# Patient Record
Sex: FEMALE | Race: BLACK OR AFRICAN AMERICAN | Employment: OTHER | URBAN - METROPOLITAN AREA
[De-identification: names, ages, dates, MRNs, and addresses within clinical notes are randomized per-mention and may not be internally consistent; named-entity substitution may affect disease eponyms.]

---

## 2021-02-21 LAB — HBA1C MFR BLD HPLC: 14.6 %

## 2021-03-10 PROBLEM — I50.32 DIASTOLIC CHF, CHRONIC (HCC): Status: ACTIVE | Noted: 2021-03-10

## 2021-03-10 PROBLEM — E78.5 HYPERLIPIDEMIA ASSOCIATED WITH TYPE 2 DIABETES MELLITUS (HCC): Status: ACTIVE | Noted: 2021-03-10

## 2021-03-10 PROBLEM — E11.65 TYPE 2 DIABETES MELLITUS WITH HYPERGLYCEMIA (HCC): Status: ACTIVE | Noted: 2021-03-10

## 2021-03-10 PROBLEM — E27.1 ADDISON'S DISEASE (HCC): Status: ACTIVE | Noted: 2021-03-10

## 2021-03-10 PROBLEM — E11.69 HYPERLIPIDEMIA ASSOCIATED WITH TYPE 2 DIABETES MELLITUS (HCC): Status: ACTIVE | Noted: 2021-03-10

## 2021-03-10 NOTE — PROGRESS NOTES
Transitional Care Management Progress Note    Patient: Jefferson Lyons  : 1950  PCP: Lucinda Harman NP    Date of admission: 2021  Date of discharge: 2021    Patient was contacted by Transitional Care Management services within two days after her discharge: No. This encounter and supporting documentation was reviewed if available. Medication reconciliation was performed today (3/11/2021). Assessment/Plan:     1. Type 2 diabetes mellitus with hyperglycemia, with long-term current use of insulin ([facility]  Assessment & Plan:  Patient unclear about regimen  Refer to pharmacist for better control, given information to call and set up appointment  Orders:  -     METABOLIC PANEL, COMPREHENSIVE; Future  -     HEMOGLOBIN A1C WITH EAG; Future  -     REFERRAL TO Mukesh Bell  2. Type 2 diabetes mellitus with peripheral neuropathy (HCC)  Assessment & Plan:  Stable on regimen, continue  Will need CSA signed prior to next refills  Orders:  -     REFERRAL TO PHARMACIST  3. Hyperlipidemia associated with type 2 diabetes mellitus ([facility]  Assessment & Plan:  Labs overdue, ordered  LDL goal <70, continue regimen for now  Orders:  -     LIPID PANEL; Future  -     METABOLIC PANEL, COMPREHENSIVE; Future  4. Hypertensive heart and chronic kidney disease with heart failure and stage 1 through stage 4 chronic kidney disease, or chronic kidney disease ([facility]  Assessment & Plan:  BP goal <130/80, continue regimen  Home BP monitoring recommended  Orders:  -     METABOLIC PANEL, COMPREHENSIVE; Future  -     REFERRAL TO CARDIOLOGY  5. Diastolic CHF, chronic (HCC)  Assessment & Plan:  Refer to cardiology for management  Orders:  -     METABOLIC PANEL, COMPREHENSIVE; Future  -     REFERRAL TO CARDIOLOGY  -     REFERRAL TO HOME HEALTH  6. Stage 3a chronic kidney disease  Assessment & Plan:  Labs ordered, continue ARBs for now  Orders:  -     METABOLIC PANEL, COMPREHENSIVE; Future  7.  Normocytic anemia  -     CBC WITH AUTOMATED DIFF; Future  8. Randall's disease ([facility]  -     REFERRAL TO ENDOCRINOLOGY  9. Rheumatoid arthritis, involving unspecified site, unspecified whether rheumatoid factor present ([facility]  -     [address]  10. Centrilobular emphysema ([facility]  Assessment & Plan:  Stable on regimen, continue  11. Bilateral femoral artery stenosis [facility])  Assessment & Plan:  Recheck labs  Continue statin and ASA for now  12. Need for hepatitis C screening test  -     HEPATITIS C AB; Future    Follow-up and Dispositions    · Return in about 4 weeks (around 4/8/2021) for  [address], diabetes, lab results. Subjective:   Kyle Glynn is a 79 y.o. female presenting today for follow-up after being discharged from [facility].  The discharge summary was reviewed or requested. The main problem requiring admission was hyperglycemia. Complications during admission: see dx list    HPI:    Patient presents today with her sister, Ms. Chyna Bassett. Patient is new to the area, moved from [place], [place]. Patient presented to the ED with c/o left arm pain and elevated blood sugar. She states the hospital did not change her insulin regimen. Patient states her blood sugar continues to be elevated between 200-400. Has not been able to tolerate Metformin d/t GI side effects    Blood pressures not being taken at home, but normally runs in the 130-140s when she goes to her doctor's office    Interval history/Current status: Guarded    Admitting symptoms have: improved    Medications marked \"taking\" at this time:  Home Medications    Medication Sig Start Date End Date Taking? Authorizing Provider   methotrexate (RHEUMATREX) 2.5 mg tablet Take 15 mg by mouth every Monday. 2/8/21  Yes Provider, Historical   pantoprazole (PROTONIX) 40 mg tablet Take 40 mg by mouth daily.  2/2/21  Yes Provider, Historical   hydrocortisone (CORTEF) 10 mg tablet Take 10 mg by mouth two (2) times a day. 2/2/21  Yes Provider, Historical   rosuvastatin (CRESTOR) 10 mg tablet Take 10 mg by mouth nightly. 2/2/21  Yes Provider, Historical   furosemide (LASIX) 40 mg tablet Take 40 mg by mouth daily. 2/2/21  Yes Provider, Historical   folic acid (FOLVITE) 1 mg tablet Take 1 mg by mouth daily. Yes Provider, Historical   aspirin delayed-release 81 mg tablet Take 81 mg by mouth daily. Yes Provider, Historical   glucose blood VI test strips (ASCENSIA AUTODISC VI, ONE TOUCH ULTRA TEST VI) strip Freestyle Lite Strips; ICD 10 Code: E 11.9 Type 2 Diabetes; On Insulin: Yes Z279.4 Long Term (current) Insulin Use. 2/2/21  Yes Provider, Historical   Blood-Glucose Meter misc Freestyle Freedom Lite Blood Glucose Meter; E 11.9 Type 2 Diabetes; On Insulin: Yes Z279.4 Long Term (current) Insulin Use. 2/2/21  Yes Provider, Historical   albuterol (PROVENTIL HFA, VENTOLIN HFA, PROAIR HFA) 90 mcg/actuation inhaler Take 2 Puffs by inhalation every six (6) hours as needed. 2/2/21  Yes Provider, Historical   insulin aspart U-100 (NOVOLOG) 100 unit/mL injection 10 Units by SubCUTAneous route Before breakfast, lunch, and dinner. 2/2/21  Yes Provider, Historical   insulin detemir U-100 (Levemir FlexTouch U-100 Insuln) 100 unit/mL (3 mL) inpn 12 Units by SubCUTAneous route every twelve (12) hours. 2/2/21  Yes Provider, Historical   lancets misc Trueplus Lancets; ICD 10 Code: E 11.9 Type 2 Diabetes; On Insulin: Yes Z279.4 Long Term (current) Insulin Use. 2/2/21  Yes Provider, Historical   gabapentin (NEURONTIN) 300 mg capsule Take 300 mg by mouth three (3) times daily. 2/2/21  Yes Provider, Historical   losartan (COZAAR) 25 mg tablet Take 25 mg by mouth daily. 2/2/21  Yes Provider, Historical   amLODIPine (NORVASC) 10 mg tablet Take 10 mg by mouth daily.  Indications: high blood pressure   Yes Provider, Historical          Patient Active Problem List   Diagnosis Code    Type 2 diabetes mellitus with hyperglycemia ([facility] E11.65    Darrius's disease ([facility] Y21.2    Diastolic CHF, chronic (HCC) I50.32    Hyperlipidemia associated with type 2 diabetes mellitus (HCC) E11.69, E78.5    Hypertensive heart and chronic kidney disease with heart failure and stage 1 through stage 4 chronic kidney disease, or chronic kidney disease (HCC) I13.0    Stage 3a chronic kidney disease N18.31    Normocytic anemia D64.9    Rheumatoid arthritis (HCC) M06.9    Type 2 diabetes mellitus with peripheral neuropathy (HCC) E11.42    Gastroesophageal reflux disease without esophagitis K21.9    Centrilobular emphysema (HCC) J43.2    Bilateral femoral artery stenosis (HCC) I70.203       Not on File   Past Medical History:   Diagnosis Date    Arthritis     Autoimmune disease (HCC)     RA    Congestive heart failure (HCC)     Neuropathy     Seizures (HCC)       Social History     Socioeconomic History    Marital status: SINGLE     Spouse name: Not on file    Number of children: Not on file    Years of education: Not on file    Highest education level: Not on file   Occupational History    Not on file   Social Needs    Financial resource strain: Not on file    Food insecurity     Worry: Not on file     Inability: Not on file    Transportation needs     Medical: Not on file     Non-medical: Not on file   Tobacco Use    Smoking status: Former Smoker     Types: Cigarettes    Smokeless tobacco: Never Used   Substance and Sexual Activity    Alcohol use: Not Currently    Drug use: Not Currently    Sexual activity: Not on file   Lifestyle    Physical activity     Days per week: Not on file     Minutes per session: Not on file    Stress: Not on file   Relationships    Social connections     Talks on phone: Not on file     Gets together: Not on file     Attends [employer] service: Not on file     Active member of club or organization: Not on file     Attends meetings of clubs or organizations: Not on file     Relationship status: Not on file    Intimate partner violence     Fear of current or ex partner: Not on file     Emotionally abused: Not on file     Physically abused: Not on file     Forced sexual activity: Not on file   Other Topics Concern    Not on file   Social History Narrative    Not on file      Past Surgical History:   Procedure Laterality Date    HX BREAST BIOPSY Bilateral       Family History   Problem Relation Age of Onset    Hypertension Mother     Hypertension Father         Review of Systems   Constitutional: Negative for chills, fever and malaise/fatigue. Respiratory: Negative for cough and shortness of breath. Cardiovascular: Positive for leg swelling. Negative for chest pain. Gastrointestinal: Negative for nausea and vomiting. Genitourinary: Positive for frequency. Neurological: Negative for dizziness and headaches. Objective:      Physical Exam   Constitutional: Stable-appearing, in no distress, alert and oriented  HENT:   Head: Normocephalic and atraumatic. Ears:  Hearing grossly intact. Mouth:  No visible perioral lesions, cyanosis, or lip swelling. Pulmonary/Chest: Does not appear dyspneic, no audible wheezes or nasal flaring. Musculoskeletal: Grossly normal active ROM in upper extremities. Neurological:  Intact recent memory, no facial or eyelid drooping, no speech impairment, answering questions appropriately. Psychiatric: Judgment and insight good, normal mood and affect. We discussed the expected course, resolution and complications of the diagnosis(es) in detail. Medication risks, benefits, costs, interactions, and alternatives were discussed as indicated. I advised her to contact the office if her condition worsens, changes or fails to improve as anticipated. She expressed understanding with the diagnosis(es) and plan.        Heather Almanza, who was evaluated through a synchronous (real-time) audio-video encounter and/or her healthcare decision maker, is aware that it is a billable service, with coverage as determined by her insurance carrier. She provided verbal consent to proceed: Yes, and patient identification was verified. It was conducted pursuant to the emergency declaration under the [address] [place] [address] and the [employer] Act. A caregiver was present when appropriate. Ability to conduct physical exam was limited. I was in the office. The patient was at home.       Christiano Olivarez NP

## 2021-03-11 ENCOUNTER — DOCUMENTATION ONLY (OUTPATIENT)
Dept: FAMILY MEDICINE CLINIC | Age: 71
End: 2021-03-11

## 2021-03-11 ENCOUNTER — VIRTUAL VISIT (OUTPATIENT)
Dept: FAMILY MEDICINE CLINIC | Age: 71
End: 2021-03-11
Payer: COMMERCIAL

## 2021-03-11 DIAGNOSIS — M06.9 RHEUMATOID ARTHRITIS, INVOLVING UNSPECIFIED SITE, UNSPECIFIED WHETHER RHEUMATOID FACTOR PRESENT (HCC): ICD-10-CM

## 2021-03-11 DIAGNOSIS — Z11.59 NEED FOR HEPATITIS C SCREENING TEST: ICD-10-CM

## 2021-03-11 DIAGNOSIS — E78.5 HYPERLIPIDEMIA ASSOCIATED WITH TYPE 2 DIABETES MELLITUS (HCC): ICD-10-CM

## 2021-03-11 DIAGNOSIS — E11.69 HYPERLIPIDEMIA ASSOCIATED WITH TYPE 2 DIABETES MELLITUS (HCC): ICD-10-CM

## 2021-03-11 DIAGNOSIS — E27.1 ADDISON'S DISEASE (HCC): ICD-10-CM

## 2021-03-11 DIAGNOSIS — N18.31 STAGE 3A CHRONIC KIDNEY DISEASE (HCC): ICD-10-CM

## 2021-03-11 DIAGNOSIS — E11.65 TYPE 2 DIABETES MELLITUS WITH HYPERGLYCEMIA, WITH LONG-TERM CURRENT USE OF INSULIN (HCC): Primary | ICD-10-CM

## 2021-03-11 DIAGNOSIS — J43.2 CENTRILOBULAR EMPHYSEMA (HCC): ICD-10-CM

## 2021-03-11 DIAGNOSIS — I13.0 HYPERTENSIVE HEART AND CHRONIC KIDNEY DISEASE WITH HEART FAILURE AND STAGE 1 THROUGH STAGE 4 CHRONIC KIDNEY DISEASE, OR CHRONIC KIDNEY DISEASE (HCC): ICD-10-CM

## 2021-03-11 DIAGNOSIS — I50.32 DIASTOLIC CHF, CHRONIC (HCC): ICD-10-CM

## 2021-03-11 DIAGNOSIS — Z79.4 TYPE 2 DIABETES MELLITUS WITH HYPERGLYCEMIA, WITH LONG-TERM CURRENT USE OF INSULIN (HCC): Primary | ICD-10-CM

## 2021-03-11 DIAGNOSIS — D64.9 NORMOCYTIC ANEMIA: ICD-10-CM

## 2021-03-11 DIAGNOSIS — I70.203 BILATERAL FEMORAL ARTERY STENOSIS (HCC): ICD-10-CM

## 2021-03-11 DIAGNOSIS — E11.42 TYPE 2 DIABETES MELLITUS WITH PERIPHERAL NEUROPATHY (HCC): ICD-10-CM

## 2021-03-11 PROBLEM — K21.9 GASTROESOPHAGEAL REFLUX DISEASE WITHOUT ESOPHAGITIS: Status: ACTIVE | Noted: 2021-03-11

## 2021-03-11 PROBLEM — I11.0 HYPERTENSIVE HEART DISEASE WITH HEART FAILURE (HCC): Status: ACTIVE | Noted: 2021-03-11

## 2021-03-11 PROCEDURE — 99204 OFFICE O/P NEW MOD 45 MIN: CPT | Performed by: NURSE PRACTITIONER

## 2021-03-11 RX ORDER — AMLODIPINE BESYLATE 5 MG/1
10 TABLET ORAL DAILY
COMMUNITY
Start: 2021-02-02 | End: 2021-03-11 | Stop reason: CLARIF

## 2021-03-11 RX ORDER — ASPIRIN 81 MG/1
81 TABLET ORAL DAILY
COMMUNITY

## 2021-03-11 RX ORDER — ALBUTEROL SULFATE 90 UG/1
2 AEROSOL, METERED RESPIRATORY (INHALATION)
COMMUNITY
Start: 2021-02-02 | End: 2022-01-20 | Stop reason: SDUPTHER

## 2021-03-11 RX ORDER — HYDROCORTISONE 10 MG/1
10 TABLET ORAL 2 TIMES DAILY
COMMUNITY
Start: 2021-02-02 | End: 2021-04-09 | Stop reason: SDUPTHER

## 2021-03-11 RX ORDER — INSULIN ASPART 100 [IU]/ML
12 INJECTION, SOLUTION INTRAVENOUS; SUBCUTANEOUS
COMMUNITY
Start: 2021-02-02 | End: 2021-11-24 | Stop reason: SDUPTHER

## 2021-03-11 RX ORDER — PANTOPRAZOLE SODIUM 40 MG/1
40 TABLET, DELAYED RELEASE ORAL DAILY
COMMUNITY
Start: 2021-02-02 | End: 2021-05-06

## 2021-03-11 RX ORDER — LOSARTAN POTASSIUM 25 MG/1
25 TABLET ORAL DAILY
COMMUNITY
Start: 2021-02-02 | End: 2021-04-07 | Stop reason: SDUPTHER

## 2021-03-11 RX ORDER — METHOTREXATE 2.5 MG/1
15 TABLET ORAL
COMMUNITY
Start: 2021-02-08

## 2021-03-11 RX ORDER — BLOOD-GLUCOSE METER
EACH MISCELLANEOUS
COMMUNITY
Start: 2021-02-02

## 2021-03-11 RX ORDER — GABAPENTIN 300 MG/1
300 CAPSULE ORAL 3 TIMES DAILY
COMMUNITY
Start: 2021-02-02 | End: 2021-04-08 | Stop reason: SDUPTHER

## 2021-03-11 RX ORDER — FOLIC ACID 1 MG/1
1 TABLET ORAL DAILY
COMMUNITY

## 2021-03-11 RX ORDER — INSULIN DETEMIR 100 [IU]/ML
12 INJECTION, SOLUTION SUBCUTANEOUS EVERY 12 HOURS
COMMUNITY
Start: 2021-02-02 | End: 2021-04-08 | Stop reason: SDUPTHER

## 2021-03-11 RX ORDER — LANCETS
EACH MISCELLANEOUS
COMMUNITY
Start: 2021-02-02

## 2021-03-11 RX ORDER — FUROSEMIDE 40 MG/1
40 TABLET ORAL DAILY
COMMUNITY
Start: 2021-02-02 | End: 2021-04-07 | Stop reason: SDUPTHER

## 2021-03-11 RX ORDER — ROSUVASTATIN CALCIUM 10 MG/1
10 TABLET, COATED ORAL
COMMUNITY
Start: 2021-02-02 | End: 2021-04-07 | Stop reason: SDUPTHER

## 2021-03-11 RX ORDER — AMLODIPINE BESYLATE 10 MG/1
10 TABLET ORAL DAILY
COMMUNITY
End: 2021-04-07 | Stop reason: SDUPTHER

## 2021-03-11 NOTE — ASSESSMENT & PLAN NOTE
Patient unclear about regimen  Refer to pharmacist for better control, given information to call and set up appointment

## 2021-03-12 ENCOUNTER — TELEPHONE (OUTPATIENT)
Dept: FAMILY MEDICINE CLINIC | Age: 71
End: 2021-03-12

## 2021-03-12 NOTE — TELEPHONE ENCOUNTER
Called patient to schedule appointment for PharmD diabetes education and management per referral from PCP LEAH Field. Confirmed patient's . Scheduled patient for PharmD telephone appointment on 3/23. Instructed patient to have all medications and BG readings ready for appointment. Patient expressed understanding and had no further questions. Thank you,  Valentina Marin.  Pura Gosselin, VIDHID, BCPS    CLINICAL PHARMACY CONSULT: MED RECONCILIATION/REVIEW ADDENDUM    For Pharmacy Admin Tracking Only    PHSO: PHSO Patient?: No  Time Spent (min): 5

## 2021-03-23 ENCOUNTER — VIRTUAL VISIT (OUTPATIENT)
Dept: FAMILY MEDICINE CLINIC | Age: 71
End: 2021-03-23

## 2021-03-23 DIAGNOSIS — Z79.4 TYPE 2 DIABETES MELLITUS WITH HYPERGLYCEMIA, WITH LONG-TERM CURRENT USE OF INSULIN (HCC): Primary | ICD-10-CM

## 2021-03-23 DIAGNOSIS — E11.65 TYPE 2 DIABETES MELLITUS WITH HYPERGLYCEMIA, WITH LONG-TERM CURRENT USE OF INSULIN (HCC): Primary | ICD-10-CM

## 2021-03-23 NOTE — Clinical Note
Hello!  I spoke with Ms. Lounejoel Hilary and Ms. Cira Carmona. I did not make any adjustments to her insulin today, mainly because she isn't getting in all of her insulin injections per day and is barely checking her blood sugars. Therefore, I am not sure yet where her control is. She would be a candidate for a CGM to help with that, but Medicare requires her to provide documentation that she has been checking blood sugars 4x daily for at least 2-4 weeks. She says she will provide this for us at her follow up with you, so if she does, please let me know. I think she may also be a candidate for an Endo referral. I did not discuss this with her, but she may want to consider an insulin pump. She will need to show that she is motivated and willing to keep up with the supplies, but at least this would alleviate the injection burden I think she is experiencing. Calling her to follow up in 2 weeks before she sees you. She also states that she needs refills for all of her medications. Thanks!   Yusuf Hernandez

## 2021-04-07 ENCOUNTER — OFFICE VISIT (OUTPATIENT)
Dept: CARDIOLOGY CLINIC | Age: 71
End: 2021-04-07
Payer: MEDICARE

## 2021-04-07 VITALS
HEIGHT: 67 IN | HEART RATE: 84 BPM | DIASTOLIC BLOOD PRESSURE: 60 MMHG | OXYGEN SATURATION: 96 % | BODY MASS INDEX: 23.7 KG/M2 | SYSTOLIC BLOOD PRESSURE: 152 MMHG | WEIGHT: 151 LBS

## 2021-04-07 DIAGNOSIS — I50.32 DIASTOLIC CHF, CHRONIC (HCC): Primary | ICD-10-CM

## 2021-04-07 LAB
ABSOLUTE LYMPHOCYTE COUNT, 10803: 2.4 K/UL (ref 1–4.8)
ANION GAP SERPL CALC-SCNC: 7.5 MMOL/L (ref 3–15)
AVG GLU, 10930: 357 MG/DL (ref 91–123)
BASOPHILS # BLD: 0.1 K/UL (ref 0–0.2)
BASOPHILS NFR BLD: 1 % (ref 0–2)
BUN SERPL-MCNC: 17 MG/DL (ref 6–22)
CALCIUM SERPL-MCNC: 9.1 MG/DL (ref 8.4–10.5)
CHLORIDE SERPL-SCNC: 104 MMOL/L (ref 98–110)
CHOLEST SERPL-MCNC: 183 MG/DL (ref 110–200)
CO2 SERPL-SCNC: 28 MMOL/L (ref 20–32)
CREAT SERPL-MCNC: 1 MG/DL (ref 0.8–1.4)
EOSINOPHIL # BLD: 0.4 K/UL (ref 0–0.5)
EOSINOPHIL NFR BLD: 5 % (ref 0–6)
ERYTHROCYTE [DISTWIDTH] IN BLOOD BY AUTOMATED COUNT: 15.1 % (ref 10–15.5)
GFRAA, 66117: >60
GFRNA, 66118: 52.4
GLUCOSE SERPL-MCNC: 151 MG/DL (ref 70–99)
GRANULOCYTES,GRANS: 59 % (ref 40–75)
HBA1C MFR BLD HPLC: 14.1 % (ref 4.8–5.6)
HCT VFR BLD AUTO: 33.5 % (ref 35.1–48.3)
HCV AB SER IA-ACNC: NORMAL
HDLC SERPL-MCNC: 3.7 MG/DL (ref 0–5)
HDLC SERPL-MCNC: 49 MG/DL
HGB BLD-MCNC: 10.5 G/DL (ref 11.7–16.1)
LDL/HDL RATIO,LDHD: 2.3
LDLC SERPL CALC-MCNC: 110 MG/DL (ref 50–99)
LYMPHOCYTES, LYMLT: 26 % (ref 20–45)
MCH RBC QN AUTO: 26 PG (ref 26–34)
MCHC RBC AUTO-ENTMCNC: 31 G/DL (ref 31–36)
MCV RBC AUTO: 84 FL (ref 81–99)
MONOCYTES # BLD: 0.9 K/UL (ref 0.1–1)
MONOCYTES NFR BLD: 10 % (ref 3–12)
NEUTROPHILS # BLD AUTO: 5.3 K/UL (ref 1.8–7.7)
NON-HDL CHOLESTEROL, 011976: 134 MG/DL
PLATELET # BLD AUTO: 232 K/UL (ref 140–440)
PMV BLD AUTO: 12.4 FL (ref 9–13)
POTASSIUM SERPL-SCNC: 4.1 MMOL/L (ref 3.5–5.5)
RBC # BLD AUTO: 3.97 M/UL (ref 3.8–5.2)
SODIUM SERPL-SCNC: 139 MMOL/L (ref 133–145)
TRIGL SERPL-MCNC: 125 MG/DL (ref 40–149)
VLDLC SERPL CALC-MCNC: 25 MG/DL (ref 8–30)
WBC # BLD AUTO: 9.1 K/UL (ref 4–11)

## 2021-04-07 PROCEDURE — 99204 OFFICE O/P NEW MOD 45 MIN: CPT | Performed by: INTERNAL MEDICINE

## 2021-04-07 RX ORDER — AMLODIPINE BESYLATE 10 MG/1
10 TABLET ORAL DAILY
Qty: 30 TAB | Refills: 6 | Status: SHIPPED | OUTPATIENT
Start: 2021-04-07 | End: 2021-04-08 | Stop reason: SDUPTHER

## 2021-04-07 RX ORDER — ROSUVASTATIN CALCIUM 10 MG/1
10 TABLET, COATED ORAL
Qty: 30 TAB | Refills: 6 | Status: SHIPPED | OUTPATIENT
Start: 2021-04-07 | End: 2021-04-08 | Stop reason: DRUGHIGH

## 2021-04-07 RX ORDER — FUROSEMIDE 40 MG/1
40 TABLET ORAL DAILY
Qty: 30 TAB | Refills: 6 | Status: SHIPPED | OUTPATIENT
Start: 2021-04-07 | End: 2021-06-29 | Stop reason: SDUPTHER

## 2021-04-07 RX ORDER — LOSARTAN POTASSIUM 25 MG/1
25 TABLET ORAL DAILY
Qty: 30 TAB | Refills: 6 | Status: SHIPPED | OUTPATIENT
Start: 2021-04-07 | End: 2021-04-08 | Stop reason: DRUGHIGH

## 2021-04-07 NOTE — PROGRESS NOTES
Patrizia Morales    HFpEF    HPI    Patrizia Morales is a 79 y.o. -American female with no known coronary disease here to establish cardiovascular care for heart failure. You know patient just recently wrote relocated from [place] living here with her sister. She has diabetes heart failure with preserved ejection fraction, hypertension rheumatoid arthritis on methotrexate as well as some adrenal issues. We will have all the records but majority come from review of her nurse practitioners note as well as from her most recent ER visit where she presented with chest pain and her blood pressure was greater than 200. Does not look like a lot of things were changed then. She did have an echocardiogram and her EF was 46% with grade 2 diastolic dysfunction to moderate MR. We reviewed her blood work together and her kidney function was normal, BNP 300s. Been taking Lasix 40 daily, still feels swollen all over but says gets better later in the day. No recurrent CP. Past Medical History:   Diagnosis Date    Arthritis     Autoimmune disease ([facility]     RA    Congestive heart failure (HCC)     Neuropathy     Seizures (HCC)        Past Surgical History:   Procedure Laterality Date    HX BREAST BIOPSY Bilateral     HX CATARACT REMOVAL         Current Outpatient Medications   Medication Sig Dispense Refill    methotrexate (RHEUMATREX) 2.5 mg tablet Take 15 mg by mouth every Monday.  pantoprazole (PROTONIX) 40 mg tablet Take 40 mg by mouth daily.  hydrocortisone (CORTEF) 10 mg tablet Take 10 mg by mouth two (2) times a day.  rosuvastatin (CRESTOR) 10 mg tablet Take 10 mg by mouth nightly.  furosemide (LASIX) 40 mg tablet Take 40 mg by mouth daily.  folic acid (FOLVITE) 1 mg tablet Take 1 mg by mouth daily.  aspirin delayed-release 81 mg tablet Take 81 mg by mouth daily.       glucose blood VI test strips (ASCENSIA AUTODISC VI, ONE TOUCH ULTRA TEST VI) strip Freestyle Lite Strips; ICD 10 Code: E 11.9 Type 2 Diabetes; On Insulin: Yes Z279.4 Long Term (current) Insulin Use.  Blood-Glucose Meter misc Freestyle Freedom Lite Blood Glucose Meter; E 11.9 Type 2 Diabetes; On Insulin: Yes Z279.4 Long Term (current) Insulin Use.  albuterol (PROVENTIL HFA, VENTOLIN HFA, PROAIR HFA) 90 mcg/actuation inhaler Take 2 Puffs by inhalation every six (6) hours as needed.  insulin aspart U-100 (NOVOLOG) 100 unit/mL injection 10 Units by SubCUTAneous route Before breakfast, lunch, and dinner.  insulin detemir U-100 (Levemir FlexTouch U-100 Insuln) 100 unit/mL (3 mL) inpn 12 Units by SubCUTAneous route every twelve (12) hours.  lancets misc Trueplus Lancets; ICD 10 Code: E 11.9 Type 2 Diabetes; On Insulin: Yes Z279.4 Long Term (current) Insulin Use.  gabapentin (NEURONTIN) 300 mg capsule Take 300 mg by mouth three (3) times daily.  losartan (COZAAR) 25 mg tablet Take 25 mg by mouth daily.  amLODIPine (NORVASC) 10 mg tablet Take 10 mg by mouth daily. Indications: high blood pressure         Allergies   Allergen Reactions    Codeine Other (comments)     Patient does not like the reaction towards her body.        Social History     Socioeconomic History    Marital status: SINGLE     Spouse name: Not on file    Number of children: Not on file    Years of education: Not on file    Highest education level: Not on file   Occupational History    Not on file   Social Needs    Financial resource strain: Not on file    Food insecurity     Worry: Not on file     Inability: Not on file    Transportation needs     Medical: Not on file     Non-medical: Not on file   Tobacco Use    Smoking status: Former Smoker     Packs/day: 0.50     Years: 50.00     Pack years: 25.00     Types: Cigarettes     Quit date: 2009     Years since quittin.2    Smokeless tobacco: Never Used   Substance and Sexual Activity    Alcohol use: Never     Frequency: Never    Drug use: Never    Sexual activity: Not on file   Lifestyle    Physical activity     Days per week: Not on file     Minutes per session: Not on file    Stress: Not on file   Relationships    Social connections     Talks on phone: Not on file     Gets together: Not on file     Attends Yazidi service: Not on file     Active member of club or organization: Not on file     Attends meetings of clubs or organizations: Not on file     Relationship status: Not on file    Intimate partner violence     Fear of current or ex partner: Not on file     Emotionally abused: Not on file     Physically abused: Not on file     Forced sexual activity: Not on file   Other Topics Concern    Not on file   Social History Narrative    Not on file    [facility]    FH: neg premature ASCVD, no SCD    Review of Systems    14 pt Review of Systems is negative unless otherwise mentioned in the HPI. Wt Readings from Last 3 Encounters:   04/07/21 68.5 kg (151 lb)     Temp Readings from Last 3 Encounters:   No data found for Temp     BP Readings from Last 3 Encounters:   04/07/21 (!) 152/60     Pulse Readings from Last 3 Encounters:   04/07/21 84            Physical Exam:    Visit Vitals  BP (!) 152/60 (BP 1 Location: Left upper arm, BP Patient Position: Sitting, BP Cuff Size: Adult)   Pulse 84   Ht 5' 6.5\" (1.689 m)   Wt 68.5 kg (151 lb)   SpO2 96%   BMI 24.01 kg/m²      Physical Exam  HENT:      Head: Normocephalic and atraumatic. Eyes:      Pupils: Pupils are equal, round, and reactive to light. Cardiovascular:      Rate and Rhythm: Normal rate and regular rhythm. Heart sounds: Normal heart sounds. No murmur. No friction rub. No gallop. Pulmonary:      Effort: Pulmonary effort is normal. No respiratory distress. Breath sounds: Normal breath sounds. No wheezing or rales. Chest:      Chest wall: No tenderness. Abdominal:      General: Bowel sounds are normal.      Palpations: Abdomen is soft.    Musculoskeletal:         General: No tenderness. Right lower leg: Edema present. Left lower leg: Edema present. Skin:     General: Skin is warm and dry. Neurological:      Mental Status: She is alert and oriented to person, place, and time. EKG today shows: NSR, normal axis and intervals, no ST segment abnormalities    Impression and Plan:  Rebecca Bean is a 79 y.o. with:    1.) AECHF/ HFpEF ~45%  2.) HTN, improved  3.) h/o atypical CP  4.) Mild to moderate MR    1.) Cont Lasix 40 mg daily  2.) RTC 2 months recheck with NP  3.) Low sodium diet and daily wts  4.) RTC with me 6 months    Theres room with her meds to increase (Losartan for better BP control if needed)  Fluid status looks compensated on exam today    Thank you for allowing me to participate in the care of your patient, please do not hesitate to call with questions or concerns. Follow-up and Dispositions    · Return in about 6 months (around 10/7/2021).      [facility],    Sherri DO Ping

## 2021-04-07 NOTE — PROGRESS NOTES
Patrizia Morales is a 79 y.o. female who was seen by synchronous (real-time) audio-video technology on 4/8/2021 for Follow-up (Abnormal labs), Diabetes, Cholesterol Problem, Hypertension, and CHF    Assessment & Plan:     1. Type 2 diabetes mellitus with hyperglycemia, with long-term current use of insulin (HCC)  Assessment & Plan:  Uncontrolled, A1c goal <7  Refer to endocrine, given contact info to make appt  Discussed diet at length, emphasized medication compliance  Recheck A1c in 3 mos  Orders:  -     METABOLIC PANEL, COMPREHENSIVE; Future  -     HEMOGLOBIN A1C WITH EAG; Future  -     MICROALBUMIN, UR, RAND W/ MICROALB/CREAT RATIO; Future  -     REFERRAL TO OPHTHALMOLOGY  -     REFERRAL TO ENDOCRINOLOGY  2. Type 2 diabetes mellitus with peripheral neuropathy (HCC)  Assessment & Plan:  Stable on gabapentin, continue for now   reviewed, no issues identified  Will need to sign CSA at next appointment  Orders:  -     METABOLIC PANEL, COMPREHENSIVE; Future  -     HEMOGLOBIN A1C WITH EAG; Future  -     MICROALBUMIN, UR, RAND W/ MICROALB/CREAT RATIO; Future  -     REFERRAL TO OPHTHALMOLOGY  -     REFERRAL TO ENDOCRINOLOGY  -     gabapentin (NEURONTIN) 300 mg capsule; Take 1 Cap by mouth three (3) times daily. Max Daily Amount: 900 mg., Normal, Disp-90 Cap, R-0  3. Stage 3a chronic kidney disease ([facility]  Assessment & Plan:  Stable, continue ARBs  Repeat labs in 3 mos  Orders:  -     METABOLIC PANEL, COMPREHENSIVE; Future  4. Hyperlipidemia associated with type 2 diabetes mellitus ([facility]  Assessment & Plan:  LDL goal <70, increase rosuvastatin   Repeat labs in 3 mos    Orders:  -     LIPID PANEL; Future  -     METABOLIC PANEL, COMPREHENSIVE; Future  -     rosuvastatin (CRESTOR) 20 mg tablet; Take 1 Tab by mouth nightly., Normal, Disp-90 Tab, R-0  5.  Normocytic anemia  Assessment & Plan:  Stable for now, monitor closely, given hx of blood transfusion  Recheck in 3 mos, colonoscopy referral generated    Orders:  -     CBC WITH AUTOMATED DIFF; Future  6. Hypertensive heart and chronic kidney disease with heart failure and stage 1 through stage 4 chronic kidney disease, or chronic kidney disease ([facility]  Assessment & Plan:  Recommend home BP monitoring  BP goal <130/80  Increase Losartan to 50 mg and follow-up in 4 weeks  Orders:  -     METABOLIC PANEL, COMPREHENSIVE; Future  -     amLODIPine (NORVASC) 10 mg tablet; Take 1 Tab by mouth daily. Indications: high blood pressure, Normal, Disp-30 Tab, R-6  -     losartan (COZAAR) 50 mg tablet; Take 1 Tab by mouth daily. , Normal, Disp-90 Tab, R-0  7. 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 Rheumatoid arthritis, involving unspecified site, unspecified whether rheumatoid factor present [facility])  Assessment & Plan:  Stable on regimen, continue  Given contact information for rheumatology, patient to call for appt  10. Postmenopausal  -     DEXA BONE DENSITY STUDY AXIAL; Future  11. Screen for colon cancer  -     REFERRAL TO GASTROENTEROLOGY  12. Encounter for screening mammogram for malignant neoplasm of breast  -     MONA MAMMO BI SCREENING INCL CAD; Future  13. Encounter to discuss test results     Follow-up and Dispositions    · Return in about 4 weeks (around 5/6/2021) for diabetes, blood pressure AND  · Return in 3 mos for diabetes, cholesterol, CKD, CHF, lab results       SUBJECTIVE:     HPI    Patient presents today with her sister, Ms. Viridiana Curtis. States she has not received her new Medicaid card and has requested this a month ago.       Type 2 DM-  Home BG readings range from 56 to \"high\" on the meter  Symptoms: numbness/tingling  On statin:  Rosuvastatin 10 mg nightly  Comorbid: HLD, HTN, CHF, CKD  Followed by endocrine: no, referred to Dr. Briana Johnson  Treatment:  as below  Novolog is 20 units TID but she only takes 10 units   Levemir 40 units BID but she only takes 20 units  Key Antihyperglycemic Medications             insulin detemir U-100 (Levemir FlexTouch U-100 Insuln) 100 unit/mL (3 mL) inpn (Taking) 40 Units by SubCUTAneous route every twelve (12) hours. Indications: type 2 diabetes mellitus    insulin aspart U-100 (NOVOLOG) 100 unit/mL injection 10 Units by SubCUTAneous route Before breakfast, lunch, and dinner.         Diabetic Foot and Eye Exam HM Status   Topic Date Due    Diabetic Foot Care  Never done    Eye Exam  Never done      Lab Results   Component Value Date/Time    Hemoglobin A1c 14.1 (H) 04/06/2021 03:40 PM      Hyperlipidemia  Compliant with meds  Treatment: rosuvastatin 10 mg nightly  S/E from medication:  None  Comorbid: type 2 DM, HLD, CKD, CHF  Lab Results   Component Value Date/Time    Cholesterol, total 183 04/06/2021 03:40 PM    HDL Cholesterol 49 04/06/2021 03:40 PM    LDL, calculated 110 (H) 04/06/2021 03:40 PM    VLDL, calculated 25 04/06/2021 03:40 PM    Triglyceride 125 04/06/2021 03:40 PM     CKD-  Stage of Chronic Kidney Disease III   Denies any BLE swelling, SOB, chest pain  NSAID use:  No  Lab Results   Component Value Date/Time    Sodium 139 04/06/2021 03:40 PM    Potassium 4.1 04/06/2021 03:40 PM    Chloride 104 04/06/2021 03:40 PM    CO2 28 04/06/2021 03:40 PM    Anion gap 7.5 04/06/2021 03:40 PM    Glucose 151 (H) 04/06/2021 03:40 PM    BUN 17 04/06/2021 03:40 PM    Creatinine 1.0 04/06/2021 03:40 PM    Calcium 9.1 04/06/2021 03:40 PM      Anemia -   Current symptoms:  None  Risk factors:  None  Treatment:  None  Has hx of blood transfusion and IV iron last year in 2020  Lab Results   Component Value Date/Time    WBC 9.1 04/06/2021 03:40 PM    HGB 10.5 (L) 04/06/2021 03:40 PM    HCT 33.5 (L) 04/06/2021 03:40 PM    PLATELET 914 01/38/3965 03:40 PM    MCV 84 04/06/2021 03:40 PM     CHF  Compliant with meds and diet  Denies chest pain, SOB, BLE swelling, weight gain  Daily weights are not being taken  Followed by cardiology:  Yes, last saw on 04/07/2021  Treatment:  Furosemide 40 mg daily, losartan 25 mg daily  Last EF:  46% (02/2020)    Hypertension-  Compliant with meds  Symptoms:  None  BP readings at home are not being taken, has a monitor at home  Treatment: amlodipine 10 mg, losartan 25 mg daily  Comorbid:  CHF, HLD, CKD, type 2 DM    Rheumatoid Arthritis -  Currently followed by rheumatologist  She is on methotrexate  She does also report neuropathy of her feet  She is worried that the steroids is what is making her sugar elevated  Last seen by specialist 3 mos ago in [place]  Has not heard back from referral to specialist    Avon's Disease-  Current symptoms:  Lethargy  Treatment:  Hydrocortisone 10 mg daily  Referred to endocrine previously, does not yet have an apt    Health Maintenance:  Urine micro - ordered  Diabetic eye exam - referral generated  Foot exam - defer to next in-office visit  COVID-19 vac - recommended  Pneumonia vac - recommended  TDAP - recommended  Shingles vac - recommended  Colorectal cancer screening - referral generated  Mammogram - ordered  DEXA - ordered    Current Outpatient Medications   Medication Sig Dispense Refill    hydrocortisone (CORTEF) 10 mg tablet Take 1 Tab by mouth two (2) times a day. 180 Tab 0    amLODIPine (NORVASC) 10 mg tablet Take 1 Tab by mouth daily. Indications: high blood pressure 30 Tab 6    gabapentin (NEURONTIN) 300 mg capsule Take 1 Cap by mouth three (3) times daily. Max Daily Amount: 900 mg. 90 Cap 0    rosuvastatin (CRESTOR) 20 mg tablet Take 1 Tab by mouth nightly. 90 Tab 0    losartan (COZAAR) 50 mg tablet Take 1 Tab by mouth daily.  90 Tab 0    insulin detemir U-100 (Levemir FlexTouch U-100 Insuln) 100 unit/mL (3 mL) inpn 40 Units by SubCUTAneous route every twelve (12) hours. Indications: type 2 diabetes mellitus 3 mL 0    furosemide (LASIX) 40 mg tablet Take 1 Tab by mouth daily. 30 Tab 6    methotrexate (RHEUMATREX) 2.5 mg tablet Take 15 mg by mouth every Monday.  pantoprazole (PROTONIX) 40 mg tablet Take 40 mg by mouth daily.  folic acid (FOLVITE) 1 mg tablet Take 1 mg by mouth daily.  aspirin delayed-release 81 mg tablet Take 81 mg by mouth daily.  glucose blood VI test strips (ASCENSIA AUTODISC VI, ONE TOUCH ULTRA TEST VI) strip Freestyle Lite Strips; ICD 10 Code: E 11.9 Type 2 Diabetes; On Insulin: Yes Z279.4 Long Term (current) Insulin Use.  Blood-Glucose Meter misc Freestyle Freedom Lite Blood Glucose Meter; E 11.9 Type 2 Diabetes; On Insulin: Yes Z279.4 Long Term (current) Insulin Use.  albuterol (PROVENTIL HFA, VENTOLIN HFA, PROAIR HFA) 90 mcg/actuation inhaler Take 2 Puffs by inhalation every six (6) hours as needed.  insulin aspart U-100 (NOVOLOG) 100 unit/mL injection 10 Units by SubCUTAneous route Before breakfast, lunch, and dinner.  lancets misc Trueplus Lancets; ICD 10 Code: E 11.9 Type 2 Diabetes; On Insulin: Yes Z279.4 Long Term (current) Insulin Use. Review of Systems   Constitutional: Negative for chills, fever and malaise/fatigue. Respiratory: Negative for shortness of breath. Cardiovascular: Positive for leg swelling. Negative for chest pain. Gastrointestinal: Negative for nausea and vomiting. Genitourinary: Positive for frequency. Neurological: Positive for tingling. Negative for dizziness and headaches. OBJECTIVE:     Physical Exam   Constitutional: Stable-appearing, in no distress, alert and oriented  HENT:   Head: Normocephalic and atraumatic. Ears:  Hearing grossly intact. Mouth:  No visible perioral lesions, cyanosis, or lip swelling.   Pulmonary/Chest: Does not appear dyspneic, no audible wheezes or nasal flaring. Musculoskeletal: Grossly normal active ROM in upper extremities. Neurological:  Intact recent memory, no facial or eyelid drooping, no speech impairment, answering questions appropriately. Psychiatric: Judgment and insight good, normal mood and affect. We discussed the expected course, resolution and complications of the diagnosis(es) in detail. Medication risks, benefits, costs, interactions, and alternatives were discussed as indicated. I advised her to contact the office if her condition worsens, changes or fails to improve as anticipated. She expressed understanding with the diagnosis(es) and plan. Rosa Porras, who was evaluated through a synchronous (real-time) audio-video encounter, and/or her healthcare decision maker, is aware that it is a billable service, with coverage as determined by her insurance carrier. provided verbal consent to proceed: Yes, and patient identification was verified. It was conducted pursuant to the emergency declaration under the [address] [place] [address], [address] [place] [address] authority and the [employer] General Act. A caregiver was present when appropriate. Ability to conduct physical exam was limited. I was in the office. The patient was at home.     Total time spent:  60 minutes  ERNESTINA Muhammad

## 2021-04-07 NOTE — PROGRESS NOTES
Guilherme Eric presents today for No chief complaint on file. Guilherme Eric preferred language for health care discussion is english/other. Is someone accompanying this pt? no    Is the patient using any DME equipment during [address]? no    Depression Screening:  3 most recent PHQ Screens 4/7/2021   Little interest or pleasure in doing things Not at all   Feeling down, depressed, irritable, or hopeless Not at all   Total Score PHQ 2 0       Learning Assessment:  Learning Assessment 4/7/2021   PRIMARY LEARNER Patient   HIGHEST LEVEL OF EDUCATION - PRIMARY LEARNER  -   BARRIERS PRIMARY LEARNER -   CO-LEARNER CAREGIVER -   PRIMARY LANGUAGE ENGLISH   LEARNER PREFERENCE PRIMARY DEMONSTRATION   ANSWERED BY patient   RELATIONSHIP SELF       Abuse Screening:  Abuse Screening Questionnaire 4/7/2021   Do you ever feel afraid of your partner? N   Are you in a relationship with someone who physically or mentally threatens you? N   Is it safe for you to go home? Y       Fall Risk  Fall Risk Assessment, last 12 mths 4/7/2021   Able to walk? Yes   Fall in past 12 months? 0   Do you feel unsteady? 0   Are you worried about falling 0   Is TUG test greater than 12 seconds? -   Is the gait abnormal? -   Number of falls in past 12 months -   Fall with injury? -       Pt currently taking Anticoagulant therapy? ASA 81mg    Coordination of Care:  1. Have you been to the ER, urgent care clinic since your last visit? Hospitalized since your last visit? no    2. Have you seen or consulted any other health care providers outside of the [address] [place] [address] since your last visit? Include any pap smears or colon screening.  no

## 2021-04-08 ENCOUNTER — VIRTUAL VISIT (OUTPATIENT)
Dept: FAMILY MEDICINE CLINIC | Age: 71
End: 2021-04-08
Payer: MEDICARE

## 2021-04-08 DIAGNOSIS — N18.31 STAGE 3A CHRONIC KIDNEY DISEASE (HCC): ICD-10-CM

## 2021-04-08 DIAGNOSIS — D64.9 NORMOCYTIC ANEMIA: ICD-10-CM

## 2021-04-08 DIAGNOSIS — Z12.11 SCREEN FOR COLON CANCER: ICD-10-CM

## 2021-04-08 DIAGNOSIS — I50.32 DIASTOLIC CHF, CHRONIC (HCC): ICD-10-CM

## 2021-04-08 DIAGNOSIS — E27.1 ADDISON'S DISEASE (HCC): ICD-10-CM

## 2021-04-08 DIAGNOSIS — E78.5 HYPERLIPIDEMIA ASSOCIATED WITH TYPE 2 DIABETES MELLITUS (HCC): ICD-10-CM

## 2021-04-08 DIAGNOSIS — E11.42 TYPE 2 DIABETES MELLITUS WITH PERIPHERAL NEUROPATHY (HCC): ICD-10-CM

## 2021-04-08 DIAGNOSIS — M06.9 RHEUMATOID ARTHRITIS, INVOLVING UNSPECIFIED SITE, UNSPECIFIED WHETHER RHEUMATOID FACTOR PRESENT (HCC): ICD-10-CM

## 2021-04-08 DIAGNOSIS — Z79.4 TYPE 2 DIABETES MELLITUS WITH HYPERGLYCEMIA, WITH LONG-TERM CURRENT USE OF INSULIN (HCC): Primary | ICD-10-CM

## 2021-04-08 DIAGNOSIS — E11.65 TYPE 2 DIABETES MELLITUS WITH HYPERGLYCEMIA, WITH LONG-TERM CURRENT USE OF INSULIN (HCC): Primary | ICD-10-CM

## 2021-04-08 DIAGNOSIS — E11.69 HYPERLIPIDEMIA ASSOCIATED WITH TYPE 2 DIABETES MELLITUS (HCC): ICD-10-CM

## 2021-04-08 DIAGNOSIS — Z12.31 ENCOUNTER FOR SCREENING MAMMOGRAM FOR MALIGNANT NEOPLASM OF BREAST: ICD-10-CM

## 2021-04-08 DIAGNOSIS — Z71.2 ENCOUNTER TO DISCUSS TEST RESULTS: ICD-10-CM

## 2021-04-08 DIAGNOSIS — I13.0 HYPERTENSIVE HEART AND CHRONIC KIDNEY DISEASE WITH HEART FAILURE AND STAGE 1 THROUGH STAGE 4 CHRONIC KIDNEY DISEASE, OR CHRONIC KIDNEY DISEASE (HCC): ICD-10-CM

## 2021-04-08 DIAGNOSIS — Z78.0 POSTMENOPAUSAL: ICD-10-CM

## 2021-04-08 PROCEDURE — 99215 OFFICE O/P EST HI 40 MIN: CPT | Performed by: NURSE PRACTITIONER

## 2021-04-08 RX ORDER — LOSARTAN POTASSIUM 50 MG/1
50 TABLET ORAL DAILY
Qty: 90 TAB | Refills: 0 | Status: SHIPPED | OUTPATIENT
Start: 2021-04-08 | End: 2021-05-06 | Stop reason: DRUGHIGH

## 2021-04-08 RX ORDER — INSULIN DETEMIR 100 [IU]/ML
40 INJECTION, SOLUTION SUBCUTANEOUS EVERY 12 HOURS
Qty: 3 ML | Refills: 0
Start: 2021-04-08 | End: 2021-06-17

## 2021-04-08 RX ORDER — AMLODIPINE BESYLATE 10 MG/1
10 TABLET ORAL DAILY
Qty: 30 TAB | Refills: 6 | Status: SHIPPED | OUTPATIENT
Start: 2021-04-08 | End: 2021-06-29 | Stop reason: SDUPTHER

## 2021-04-08 RX ORDER — ROSUVASTATIN CALCIUM 20 MG/1
20 TABLET, COATED ORAL
Qty: 90 TAB | Refills: 0 | Status: SHIPPED | OUTPATIENT
Start: 2021-04-08 | End: 2021-06-30

## 2021-04-08 RX ORDER — GABAPENTIN 300 MG/1
300 CAPSULE ORAL 3 TIMES DAILY
Qty: 90 CAP | Refills: 0 | Status: SHIPPED | OUTPATIENT
Start: 2021-04-08

## 2021-04-08 NOTE — PROGRESS NOTES
Jf Lynn presents today for No chief complaint on file. Virtual/telephone visit    Depression Screening:  3 most recent PHQ Screens 4/8/2021   Little interest or pleasure in doing things More than half the days   Feeling down, depressed, irritable, or hopeless More than half the days   Total Score PHQ 2 4   Trouble falling or staying asleep, or sleeping too much Nearly every day   Feeling tired or having little energy Nearly every day   Poor appetite, weight loss, or overeating More than half the days   Feeling bad about yourself - or that you are a failure or have let yourself or your family down Not at all   Trouble concentrating on things such as school, work, reading, or watching TV Not at all   Thoughts of being better off dead, or hurting yourself in some way Not at all       Learning Assessment:  Learning Assessment 4/7/2021   PRIMARY LEARNER Patient   HIGHEST LEVEL OF EDUCATION - PRIMARY LEARNER  -   [facility] -   CO-LEARNER CAREGIVER -   PRIMARY LANGUAGE ENGLISH   LEARNER PREFERENCE PRIMARY DEMONSTRATION   ANSWERED BY patient   RELATIONSHIP SELF       Fall Risk  Fall Risk Assessment, last 12 mths 4/7/2021   Able to walk? Yes   Fall in past 12 months? 0   Do you feel unsteady? 0   Are you worried about falling 0   Is TUG test greater than 12 seconds? -   Is the gait abnormal? -   Number of falls in past 12 months -   Fall with injury? -       ADL  No flowsheet data found. Travel Screening:    Travel Screening     Question   Response    In the last month, have you been in contact with someone who was confirmed or suspected to have Coronavirus / COVID-19? No / Unsure    Have you had a COVID-19 viral test in the last 14 days? No    Do you have any of the following new or worsening symptoms? Have you traveled internationally or domestically in the last month?   No      Travel History   Travel since 03/08/21     No documented travel since 03/08/21          [facility] reviewed and discussed and ordered per Provider. Health Maintenance Due   Topic Date Due    Foot Exam Q1  Never done    MICROALBUMIN Q1  Never done    Eye Exam Retinal or Dilated  Never done    COVID-19 Vaccine (1) Never done    DTaP/Tdap/Td series (1 - Tdap) Never done    Shingrix Vaccine Age 50> (1 of 2) Never done    Colorectal Cancer Screening Combo  Never done    Breast Cancer Screen Mammogram  Never done    Bone Densitometry (Dexa) Screening  Never done    Pneumococcal 65+ years (1 of 1 - PPSV23) Never done   . Coordination of Care:    1. Have you been to the ER, urgent care clinic since your last visit? Hospitalized since your last visit? Yes, 2 weeks ago [place], [place]     2. Have you seen or consulted any other health care providers outside of the [address] [place] [address] since your last visit? Include any pap smears or colon screening.  no

## 2021-04-08 NOTE — ASSESSMENT & PLAN NOTE
Stable for now, monitor closely, given hx of blood transfusion  Recheck in 3 mos, colonoscopy referral generated

## 2021-04-08 NOTE — ASSESSMENT & PLAN NOTE
Stable on gabapentin, continue for now   reviewed, no issues identified  Will need to sign CSA at next appointment

## 2021-04-09 ENCOUNTER — TELEPHONE (OUTPATIENT)
Dept: FAMILY MEDICINE CLINIC | Age: 71
End: 2021-04-09

## 2021-04-09 RX ORDER — HYDROCORTISONE 10 MG/1
10 TABLET ORAL 2 TIMES DAILY
Qty: 180 TAB | Refills: 0 | Status: SHIPPED | OUTPATIENT
Start: 2021-04-09 | End: 2021-07-01

## 2021-04-09 NOTE — TELEPHONE ENCOUNTER
Spoke to patient's sister, Ms. Bob Ortiz, and discussed the need to continue hydrocortisone for patient's adrenal insufficiency until seen by specialist.  Ms. Bob Ortiz verbalized understanding and will  all refills at the pharmacy.

## 2021-04-09 NOTE — ASSESSMENT & PLAN NOTE
Stable, continue regimen for now until seen by endo  Watch blood sugar closely  Given contact info for endocrine, patient to call to make appt

## 2021-04-09 NOTE — TELEPHONE ENCOUNTER
Attempted to call patient to discuss again the indication for her Cortef (hydrocortisone) medication, as patient did not know why she was taking this. I have instructed her to hold the medication until her blood sugar improves. However, after careful review of her history, patient has Darrius's Disease and is most likely taking the steroids for this. She will need to continue the medication and follow-up with rheumatology as planned. Spoke with family member who will have patient or her sister call me today.

## 2021-04-27 PROBLEM — E11.3293 NON-PROLIFERATIVE DIABETIC RETINOPATHY, BOTH EYES (HCC): Status: ACTIVE | Noted: 2021-04-27

## 2021-05-03 NOTE — PROGRESS NOTES
Chief Complaint   Patient presents with    Hypertension    Diabetes    CHF    GERD    Memory Loss     Assessment & Plan:     1. Type 2 diabetes mellitus with hyperglycemia, with long-term current use of insulin (HCC)  Assessment & Plan:  Uncontrolled, A1c goal <7  Unclear if patient was seen by endocrine, will call to request records  Continue regimen for now  2. Hypertensive heart and chronic kidney disease with heart failure and stage 1 through stage 4 chronic kidney disease, or chronic kidney disease ([facility] [address]  Assessment & Plan:  BP elevated, increase Losartan to 100 mg daily and follow-up in 4 weeks  Orders:  -     losartan (COZAAR) 100 mg tablet; Take 1 Tab by mouth daily. Indications: high blood pressure, Normal, Disp-90 Tab, R-0  3. Diastolic CHF, chronic (HCC)  Assessment & Plan:  Stable, continue regimen per cards  4. Gastroesophageal reflux disease without esophagitis  Assessment & Plan:  Stable, discontinue PPI to reduce long-term side effects  May consider H2 blocker for symptom recurrence  5. Memory loss  Assessment & Plan:  Worsening, refer to neuropsych for further eval  Orders:  -     REFERRAL TO NEUROPSYCHOLOGY  6. Decreased pedal pulses  Comments:  Arterial studies ordered to r/o PAD  Orders:  -     DUPLEX LOW EXT ARTERIES WITH PAUL;  Future    Follow-up and Dispositions    · Return in about 4 weeks (around 6/3/2021) for blood pressure AND  · Return in 2 mos for diabetes, cholesterol, CKD, CHF, lab results       Subjective:     HPI    Patient presents today with her sister, Ms. Gabi Barney, who helps take care of the patient    Type 2 DM-  Compliant with meds  Home BG readings range from 171-225  Symptoms:  Numbness/tingling of bilateral feet  On statin:  Rosuvastatin 20 mg nightly  Comorbid: Type 2 DM, HTN, CKD, CHF  Followed by endocrine: was referred 4 weeks ago  Treatment:  as below  Saw the eye doctor last week, she was referred to see the surgeon  States she saw endocrine but does not recall when or who she saw  Key Antihyperglycemic Medications             insulin detemir U-100 (Levemir FlexTouch U-100 Insuln) 100 unit/mL (3 mL) inpn (Taking) 40 Units by SubCUTAneous route every twelve (12) hours. Indications: type 2 diabetes mellitus    insulin aspart U-100 (NOVOLOG) 100 unit/mL injection (Taking) 10 Units by SubCUTAneous route Before breakfast, lunch, and dinner.         Diabetic Foot and Eye Exam HM Status   Topic Date Due    Diabetic Foot Care  05/06/2022    Eye Exam  04/19/2023      Lab Results   Component Value Date/Time    Hemoglobin A1c 14.1 (H) 04/06/2021 03:40 PM      Hypertension-  Compliant with meds  Symptoms:  None  BP readings at home are not being taken  Treatment:  Amlodipine 10 mg, Losartan 50 mg daily  Comorbid: type 2 DM, CHF, HLD, CKD    CHF  Compliant with meds and diet  Denies chest pain, SOB, BLE swelling, weight gain  Daily weights are not being taken  Followed by cardiology:  Yes, last saw on 04/07/2021  Treatment:  Furosemide 40 mg daily, losartan 50 mg daily  Last EF:  46% (02/2020)    GERD -  Associated symptoms: heartburn  Aggravating factors: unsure  Relieving factors: medication  Treatment:  Pantoprazole 40 mg daily  Has not taken medication for a few mos  Reports minimal symptoms    Memory Loss-  Onset:  A few mos  Patient's sister states patient has intermittent hallucinations  She also states patient will sometimes say things that \"don't make sense\"  Patient admits to poor short-term memory    Health Maintenance:  Diabetic eye exam - done, records requested  Foot exam - done today  COVID-19 vac - recommended, will register online  Pneumonia vac - recommended, will give once COVID vaccine completed  TDAP - previously received at [facility], a couple of mos ago  Shingles vac - recommended  Colorectal cancer screening - referred previously, advised to schedule  Mammogram -scheduled on 05/20/2021  DEXA - scheduled on 05/20/2021    Current Outpatient Medications Medication Sig Dispense Refill    losartan (COZAAR) 100 mg tablet Take 1 Tab by mouth daily. Indications: high blood pressure 90 Tab 0    hydrocortisone (CORTEF) 10 mg tablet Take 1 Tab by mouth two (2) times a day. 180 Tab 0    amLODIPine (NORVASC) 10 mg tablet Take 1 Tab by mouth daily. Indications: high blood pressure 30 Tab 6    gabapentin (NEURONTIN) 300 mg capsule Take 1 Cap by mouth three (3) times daily. Max Daily Amount: 900 mg. 90 Cap 0    rosuvastatin (CRESTOR) 20 mg tablet Take 1 Tab by mouth nightly. 90 Tab 0    insulin detemir U-100 (Levemir FlexTouch U-100 Insuln) 100 unit/mL (3 mL) inpn 40 Units by SubCUTAneous route every twelve (12) hours. Indications: type 2 diabetes mellitus 3 mL 0    furosemide (LASIX) 40 mg tablet Take 1 Tab by mouth daily. 30 Tab 6    methotrexate (RHEUMATREX) 2.5 mg tablet Take 15 mg by mouth every Monday.  folic acid (FOLVITE) 1 mg tablet Take 1 mg by mouth daily.  aspirin delayed-release 81 mg tablet Take 81 mg by mouth daily.  glucose blood VI test strips (ASCENSIA AUTODISC VI, ONE TOUCH ULTRA TEST VI) strip Freestyle Lite Strips; ICD 10 Code: E 11.9 Type 2 Diabetes; On Insulin: Yes Z279.4 Long Term (current) Insulin Use.  Blood-Glucose Meter misc Freestyle Freedom Lite Blood Glucose Meter; E 11.9 Type 2 Diabetes; On Insulin: Yes Z279.4 Long Term (current) Insulin Use.  albuterol (PROVENTIL HFA, VENTOLIN HFA, PROAIR HFA) 90 mcg/actuation inhaler Take 2 Puffs by inhalation every six (6) hours as needed.  insulin aspart U-100 (NOVOLOG) 100 unit/mL injection 10 Units by SubCUTAneous route Before breakfast, lunch, and dinner.  lancets misc Trueplus Lancets; ICD 10 Code: E 11.9 Type 2 Diabetes; On Insulin: Yes Z279.4 Long Term (current) Insulin Use. Review of Systems   Constitutional: Negative for chills, fever and malaise/fatigue. Respiratory: Negative for shortness of breath. Cardiovascular: Positive for leg swelling. Negative for chest pain. Gastrointestinal: Negative for nausea and vomiting. Neurological: Positive for tingling. Negative for dizziness and headaches. Psychiatric/Behavioral: Positive for hallucinations and memory loss. Objective:   BP (!) 174/81   Pulse 92   Ht 5' 6\" (1.676 m)   Wt 151 lb (68.5 kg)   SpO2 100%   BMI 24.37 kg/m²      Physical Exam  Vitals signs and nursing note reviewed. Constitutional:       General: She is not in acute distress. Appearance: She is not ill-appearing. HENT:      Head: Normocephalic and atraumatic. Cardiovascular:      Rate and Rhythm: Normal rate and regular rhythm. Pulmonary:      Effort: Pulmonary effort is normal. No respiratory distress. Breath sounds: No wheezing, rhonchi or rales. Musculoskeletal:         General: Swelling (+)1-2 pitting edema bilaterally, right>left present. Feet:    Skin:     General: Skin is warm and dry. Neurological:      General: No focal deficit present. Mental Status: She is alert and oriented to person, place, and time. Psychiatric:         Mood and Affect: Mood normal.         Thought Content:  Thought content normal.         Judgment: Judgment normal.        Diabetic Foot Exam:  See above      Total time spent:  50 minutes    UMER Reddy-C

## 2021-05-06 ENCOUNTER — OFFICE VISIT (OUTPATIENT)
Dept: FAMILY MEDICINE CLINIC | Age: 71
End: 2021-05-06
Payer: COMMERCIAL

## 2021-05-06 ENCOUNTER — TELEPHONE (OUTPATIENT)
Dept: FAMILY MEDICINE CLINIC | Age: 71
End: 2021-05-06

## 2021-05-06 VITALS
DIASTOLIC BLOOD PRESSURE: 81 MMHG | WEIGHT: 151 LBS | SYSTOLIC BLOOD PRESSURE: 174 MMHG | OXYGEN SATURATION: 100 % | HEART RATE: 92 BPM | HEIGHT: 66 IN | BODY MASS INDEX: 24.27 KG/M2

## 2021-05-06 DIAGNOSIS — K21.9 GASTROESOPHAGEAL REFLUX DISEASE WITHOUT ESOPHAGITIS: ICD-10-CM

## 2021-05-06 DIAGNOSIS — E11.65 TYPE 2 DIABETES MELLITUS WITH HYPERGLYCEMIA, WITH LONG-TERM CURRENT USE OF INSULIN (HCC): Primary | ICD-10-CM

## 2021-05-06 DIAGNOSIS — R41.3 MEMORY LOSS: ICD-10-CM

## 2021-05-06 DIAGNOSIS — R09.89 DECREASED PEDAL PULSES: ICD-10-CM

## 2021-05-06 DIAGNOSIS — I50.32 DIASTOLIC CHF, CHRONIC (HCC): ICD-10-CM

## 2021-05-06 DIAGNOSIS — I13.0 HYPERTENSIVE HEART AND CHRONIC KIDNEY DISEASE WITH HEART FAILURE AND STAGE 1 THROUGH STAGE 4 CHRONIC KIDNEY DISEASE, OR CHRONIC KIDNEY DISEASE (HCC): ICD-10-CM

## 2021-05-06 DIAGNOSIS — Z79.4 TYPE 2 DIABETES MELLITUS WITH HYPERGLYCEMIA, WITH LONG-TERM CURRENT USE OF INSULIN (HCC): Primary | ICD-10-CM

## 2021-05-06 PROCEDURE — 99215 OFFICE O/P EST HI 40 MIN: CPT | Performed by: NURSE PRACTITIONER

## 2021-05-06 RX ORDER — LOSARTAN POTASSIUM 100 MG/1
100 TABLET ORAL DAILY
Qty: 90 TAB | Refills: 0 | Status: SHIPPED | OUTPATIENT
Start: 2021-05-06 | End: 2021-07-29

## 2021-05-06 NOTE — PROGRESS NOTES
Oneal Jadedionte presents today for   Chief Complaint   Patient presents with    Hypertension    Diabetes       Is someone accompanying this pt? no    Is the patient using any DME equipment during OV? no    Depression Screening:  3 most recent PHQ Screens 5/6/2021   Little interest or pleasure in doing things Not at all   Feeling down, depressed, irritable, or hopeless Not at all   Total Score PHQ 2 0   Trouble falling or staying asleep, or sleeping too much -   Feeling tired or having little energy -   Poor appetite, weight loss, or overeating -   Feeling bad about yourself - or that you are a failure or have let yourself or your family down -   Trouble concentrating on things such as school, work, reading, or watching TV -   Thoughts of being better off dead, or hurting yourself in some way -       Learning Assessment:  Learning Assessment 4/7/2021   PRIMARY LEARNER Patient   HIGHEST LEVEL OF EDUCATION - PRIMARY LEARNER  -   BARRIERS PRIMARY LEARNER -   CO-LEARNER CAREGIVER -   PRIMARY LANGUAGE ENGLISH   LEARNER PREFERENCE PRIMARY DEMONSTRATION   ANSWERED BY patient   RELATIONSHIP SELF       Fall Risk  Fall Risk Assessment, last 12 mths 5/6/2021   Able to walk? Yes   Fall in past 12 months? 1   Do you feel unsteady? 1   Are you worried about falling 1   Is TUG test greater than 12 seconds? 0   Is the gait abnormal? 1   Number of falls in past 12 months 1   Fall with injury? 0       ADL  No flowsheet data found. Travel Screening:    Travel Screening     Question   Response    In the last month, have you been in contact with someone who was confirmed or suspected to have Coronavirus / COVID-19? No / Unsure    Have you had a COVID-19 viral test in the last 14 days? No    Do you have any of the following new or worsening symptoms? Have you traveled internationally or domestically in the last month?   No      Travel History   Travel since 04/06/21     No documented travel since 04/06/21          Health Maintenance reviewed and discussed and ordered per Provider. Health Maintenance Due   Topic Date Due    Foot Exam Q1  Never done    MICROALBUMIN Q1  Never done    COVID-19 Vaccine (1) Never done    DTaP/Tdap/Td series (1 - Tdap) Never done    Shingrix Vaccine Age 50> (1 of 2) Never done    Colorectal Cancer Screening Combo  Never done    Breast Cancer Screen Mammogram  Never done    Bone Densitometry (Dexa) Screening  Never done    Pneumococcal 65+ years (1 of 1 - PPSV23) Never done   . Coordination of Care:  1. Have you been to the ER, urgent care clinic since your last visit? Hospitalized since your last visit? No     2. Have you seen or consulted any other health care providers outside of the [address] [place] [address] since your last visit? Include any pap smears or colon screening.  no

## 2021-05-06 NOTE — ASSESSMENT & PLAN NOTE
Uncontrolled, A1c goal <7  Unclear if patient was seen by endocrine, will call to request records  Continue regimen for now

## 2021-05-06 NOTE — TELEPHONE ENCOUNTER
Please call Dr. Horacio White office ([facility]) for most recent notes. We also need patient's most recent eye exam from Dr. Doris Young' office. Thank you.

## 2021-05-06 NOTE — ASSESSMENT & PLAN NOTE
Stable, discontinue PPI to reduce long-term side effects  May consider H2 blocker for symptom recurrence

## 2021-05-24 ENCOUNTER — OFFICE VISIT (OUTPATIENT)
Dept: NEUROLOGY | Age: 71
End: 2021-05-24
Payer: COMMERCIAL

## 2021-05-24 DIAGNOSIS — F41.8 ANXIETY ABOUT HEALTH: ICD-10-CM

## 2021-05-24 DIAGNOSIS — R56.9 SEIZURE (HCC): ICD-10-CM

## 2021-05-24 DIAGNOSIS — R41.3 MEMORY LOSS: Primary | ICD-10-CM

## 2021-05-24 DIAGNOSIS — R45.89 SYMPTOMS OF DEPRESSION: ICD-10-CM

## 2021-05-24 PROCEDURE — 90791 PSYCH DIAGNOSTIC EVALUATION: CPT | Performed by: PSYCHOLOGIST

## 2021-05-24 NOTE — PROGRESS NOTES
[facility]  Neuroscience   [address] [place] [address]. [facility] [place], [address]  Office:  [phone]  Fax: [phone]                  Initial Office Exam  Patient Name: Reza Savage  Age: 79 y.o. Gender: female   Handedness: left handed   Presenting Concern: memory loss  Primary Care Physician: Zandra Zazueta NP  Referring Provider: Zandra Zazueta NP      REASON FOR REFERRAL:  This comprehensive and medically necessary neuropsychological assessment was requested to assist a differential diagnosis of memory complaints. The use and purpose of this examination, as well as the extent and limitations of confidentiality, were explained prior to obtaining permission to participate. Instructions were provided regarding the necessity to put forth optimal effort and answer questions truthfully in order to obtain reliable and accurate test results. REVIEW OF RECORDS:  Ms. Gemma Shi was referred by her PCP for a work-up of memory loss. Hospital records note uncontrolled diabetes due to noncompliance with insulin. An EEG on 2/1/2021 showed the following: This is an abnormal EEG. The diffuse background slowing is   consistent with mild encephalopathy.  No epileptiform discharges   or seizures were seen. This does not preclude a diagnosis of   epilepsy. A CT of the head on 1/27/21 showed:  1. No acute intracranial hemorrhage, mass effect, midline shift, or herniation.  No definite CT evidence of acute cortical infarct is seen.  Please note that noncontrast head CT may be normal in early acute infarct. 2. Mild nonspecific white matter disease likely representing chronic small vessel changes. Current Outpatient Medications   Medication Sig    losartan (COZAAR) 100 mg tablet Take 1 Tab by mouth daily. Indications: high blood pressure    hydrocortisone (CORTEF) 10 mg tablet Take 1 Tab by mouth two (2) times a day.     amLODIPine (NORVASC) 10 mg tablet Take 1 Tab by mouth daily. Indications: high blood pressure    gabapentin (NEURONTIN) 300 mg capsule Take 1 Cap by mouth three (3) times daily. Max Daily Amount: 900 mg.    rosuvastatin (CRESTOR) 20 mg tablet Take 1 Tab by mouth nightly.  insulin detemir U-100 (Levemir FlexTouch U-100 Insuln) 100 unit/mL (3 mL) inpn 40 Units by SubCUTAneous route every twelve (12) hours. Indications: type 2 diabetes mellitus    furosemide (LASIX) 40 mg tablet Take 1 Tab by mouth daily.  methotrexate (RHEUMATREX) 2.5 mg tablet Take 15 mg by mouth every Monday.  folic acid (FOLVITE) 1 mg tablet Take 1 mg by mouth daily.  aspirin delayed-release 81 mg tablet Take 81 mg by mouth daily.  glucose blood VI test strips (ASCENSIA AUTODISC VI, ONE TOUCH ULTRA TEST VI) strip Freestyle Lite Strips; ICD 10 Code: E 11.9 Type 2 Diabetes; On Insulin: Yes Z279.4 Long Term (current) Insulin Use.  Blood-Glucose Meter misc Freestyle Freedom Lite Blood Glucose Meter; E 11.9 Type 2 Diabetes; On Insulin: Yes Z279.4 Long Term (current) Insulin Use.  albuterol (PROVENTIL HFA, VENTOLIN HFA, PROAIR HFA) 90 mcg/actuation inhaler Take 2 Puffs by inhalation every six (6) hours as needed.  insulin aspart U-100 (NOVOLOG) 100 unit/mL injection 10 Units by SubCUTAneous route Before breakfast, lunch, and dinner.  lancets misc Trueplus Lancets; ICD 10 Code: E 11.9 Type 2 Diabetes; On Insulin: Yes Z279.4 Long Term (current) Insulin Use. No current facility-administered medications for this visit. Past Medical History:   Diagnosis Date    Arthritis     Autoimmune disease ([facility] [address]     RA    Congestive heart failure (HCC)     Neuropathy     Seizures (HCC)          Past Surgical History:   Procedure Laterality Date    HX BREAST BIOPSY Bilateral     HX CATARACT REMOVAL         CLINICAL INTERVIEW:  Ms. Wendi Zamora was accompanied by her sister for her initial interview.   Consistent with records, they reported memory loss which has been worsening over the previous year. Neurologic history is negative for stroke and significant head trauma but positive for syncope secondary to seizures. With regard to seizures, Ms. Wendi Zamora was unable to provide a clear history as to the onset and trajectory. Hospital records note at least one seizure. However, Ms. Wendi Zamora stated that she has not been referred to neurology. She also stated that she had not had an EEG but one was found in records. Sleep disturbance is significant for hypersomnia. Ms. Wendi Zamora sleeps most of the day and all night. That said, she does have some difficulties with sleep maintenance. There is no known snoring or previous sleep study. Pain complaints include intermittent leg, back, and hand pain due to arthritis. There is no significant history of alcohol or illicit substance use. Tobacco use ceased in 2009. Family history of neurologic illness is significant for stroke in the mother. With regard to emotional functioning, Ms. Wendi Zamora denied a history of suicidal ideation, psychiatric hospitalization, self-harm behaviors, and psychological trauma. She did, however report depressive symptomatology due to her poor health, particularly her vision difficulties. Socially, Ms. Wendi Zamora has never been . She has several adult children. Academically, she completed 14 years of education and denied a history of LD. 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 Ms. Wendi Zamora resides with her sister. She relocated from [place] 3 years ago. Functionally, Ms. Wendi Zamora is dependent on her sister for medication management and bill payment. She has not driven in 4-5 years due to neuropathy.       MENTAL STATUS:    Sensorium  Awake, Aware, Alert   Orientation person, place and situation   Relations cooperative and passive   Eye Contact appropriate   Appearance:  casually dressed   Motor Behavior:  hypoactive and within normal limits   Speech:  soft and garbled   Vocabulary average   Thought Process: disorganized   Thought Content free of delusions and free of hallucinations   Suicidal ideations none   Homicidal ideations none   Mood:  depressed   Affect:  mood-congruent   Memory recent  impaired   Memory remote:  impaired   Concentration:  adequate   Abstraction:  abstract   Insight:  fair   Reliability fair   Judgment:  fair         DIAGNOSTIC IMPRESSIONS:  1. Cognitive Decline: R/O Major Neurocognitive Disorder  2. Symptoms of Depression  3. Anxiety about health      PLAN:  1. Complete a comprehensive neuropsychological assessment to provide a differential diagnosis of presenting concerns as well as to assist with disposition and treatment planning as appropriate. 2. Consider compensatory and remedial cognitive training. 3. Consider nonpharmacological interventions for mood disorder. 4. Consider an adaptive driving evaluation. 5. Consider referral for elder health nurse to provide an in-home functional assessment. 6. Consider placement issues to provide greater structure and supervision to ensure safety, health and well-being. [id number] x 1 Review of records. Face to face interview w/ patient. Determine test protocol: 60 minutes. Total 1 unit      Nelly Haynes, PHD  Licensed Clinical Psychologist    This note was created using voice recognition software. Despite editing, there may be syntax errors.

## 2021-06-02 NOTE — PROGRESS NOTES
Chief Complaint   Patient presents with    Hypertension     Reading was normal    [facility] Visit     Assessment & Plan:     1. Hypertensive heart and chronic kidney disease with heart failure and stage 1 through stage 4 chronic kidney disease, or chronic kidney disease ([facility] [address]  Assessment & Plan:  BP improved, goal <130/80, continue regimen  Follow-up with cardiology as scheduled  2. Memory loss  Assessment & Plan:  Has seen neuropsychology, workup pending  3. Decreased pedal pulses  Comments:  Arterial duplex previously ordered, advised to schedule    Follow-up and Dispositions    · Return in about 2 months (around 8/3/2021) for diabetes, cholesterol, CKD, CHF, lab results. Subjective:     HPI    Patient presents today with her sister, Ms. Johnny Owens, who helps take care of the patient    Hypertension-  Compliant with meds  Symptoms:  None  BP readings at home:  \"are good\" but does not recall readings  Treatment:  Amlodipine 10 mg, Losartan increased to 100 mg four weeks ago  Comorbid: type 2 DM, CHF, HLD, CKD    Memory Loss-  Onset:  A few mos  Patient's sister states patient has intermittent hallucinations  She also states patient will sometimes say things that \"don't make sense\"  Patient admits to poor short-term memory  Was seen and evaluated by Dr. Maria Guadalupe Kuhn, workup pending    Decreased Pedal Pulses-  Found during foot exam four weeks ago  Arterial duplex was ordered, pending    Health Maintenance:  COVID-19 vac - recommended, will register online  Pneumonia vac - recommended, will give once COVID vaccine completed  TDAP - previously received at [facility], a couple of mos ago  Shingles vac - recommended  Colorectal cancer screening - referred previously, advised to schedule  Mammogram -scheduled on 06/09/2021  DEXA - scheduled on 06/09/2021    Current Outpatient Medications   Medication Sig Dispense Refill    losartan (COZAAR) 100 mg tablet Take 1 Tab by mouth daily.  Indications: high blood pressure 90 Tab 0    hydrocortisone (CORTEF) 10 mg tablet Take 1 Tab by mouth two (2) times a day. 180 Tab 0    amLODIPine (NORVASC) 10 mg tablet Take 1 Tab by mouth daily. Indications: high blood pressure 30 Tab 6    gabapentin (NEURONTIN) 300 mg capsule Take 1 Cap by mouth three (3) times daily. Max Daily Amount: 900 mg. 90 Cap 0    rosuvastatin (CRESTOR) 20 mg tablet Take 1 Tab by mouth nightly. 90 Tab 0    insulin detemir U-100 (Levemir FlexTouch U-100 Insuln) 100 unit/mL (3 mL) inpn 40 Units by SubCUTAneous route every twelve (12) hours. Indications: type 2 diabetes mellitus 3 mL 0    furosemide (LASIX) 40 mg tablet Take 1 Tab by mouth daily. 30 Tab 6    methotrexate (RHEUMATREX) 2.5 mg tablet Take 15 mg by mouth every Monday.  folic acid (FOLVITE) 1 mg tablet Take 1 mg by mouth daily.  aspirin delayed-release 81 mg tablet Take 81 mg by mouth daily.  glucose blood VI test strips (ASCENSIA AUTODISC VI, ONE TOUCH ULTRA TEST VI) strip Freestyle Lite Strips; ICD 10 Code: E 11.9 Type 2 Diabetes; On Insulin: Yes Z279.4 Long Term (current) Insulin Use.  Blood-Glucose Meter misc Freestyle Freedom Lite Blood Glucose Meter; E 11.9 Type 2 Diabetes; On Insulin: Yes Z279.4 Long Term (current) Insulin Use.  albuterol (PROVENTIL HFA, VENTOLIN HFA, PROAIR HFA) 90 mcg/actuation inhaler Take 2 Puffs by inhalation every six (6) hours as needed.  insulin aspart U-100 (NOVOLOG) 100 unit/mL injection 10 Units by SubCUTAneous route Before breakfast, lunch, and dinner.  lancets misc Trueplus Lancets; ICD 10 Code: E 11.9 Type 2 Diabetes; On Insulin: Yes Z279.4 Long Term (current) Insulin Use. Review of Systems   Constitutional: Negative for chills, fever and malaise/fatigue. Respiratory: Negative for cough and shortness of breath. Cardiovascular: Negative for chest pain. Gastrointestinal: Positive for nausea. Negative for vomiting. Genitourinary: Positive for frequency. Musculoskeletal: Negative for myalgias. Neurological: Positive for tingling. Negative for dizziness and headaches. Objective:   /70 (BP 1 Location: Left upper arm, BP Patient Position: Sitting, BP Cuff Size: Adult)   Pulse 79   Temp 97.2 °F (36.2 °C) (Oral)   Ht 5' 6\" (1.676 m)   Wt 151 lb (68.5 kg)   SpO2 97%   BMI 24.37 kg/m²      Physical Exam  Vitals and nursing note reviewed. Constitutional:       General: She is not in acute distress. Appearance: She is not ill-appearing. HENT:      Head: Normocephalic and atraumatic. Cardiovascular:      Rate and Rhythm: Normal rate and regular rhythm. Heart sounds: No murmur heard. No friction rub. No gallop. Pulmonary:      Effort: Pulmonary effort is normal. No respiratory distress. Breath sounds: No wheezing, rhonchi or rales. Skin:     General: Skin is warm and dry. Neurological:      General: No focal deficit present. Mental Status: She is alert and oriented to person, place, and time. Psychiatric:         Mood and Affect: Mood normal.         Thought Content:  Thought content normal.         Judgment: Judgment normal.            ERNESTINA Gregory

## 2021-06-03 ENCOUNTER — OFFICE VISIT (OUTPATIENT)
Dept: FAMILY MEDICINE CLINIC | Age: 71
End: 2021-06-03
Payer: MEDICARE

## 2021-06-03 ENCOUNTER — TELEPHONE (OUTPATIENT)
Dept: FAMILY MEDICINE CLINIC | Age: 71
End: 2021-06-03

## 2021-06-03 VITALS
SYSTOLIC BLOOD PRESSURE: 132 MMHG | OXYGEN SATURATION: 97 % | BODY MASS INDEX: 24.27 KG/M2 | HEART RATE: 79 BPM | DIASTOLIC BLOOD PRESSURE: 70 MMHG | HEIGHT: 66 IN | TEMPERATURE: 97.2 F | WEIGHT: 151 LBS

## 2021-06-03 DIAGNOSIS — R41.3 MEMORY LOSS: ICD-10-CM

## 2021-06-03 DIAGNOSIS — R09.89 DECREASED PEDAL PULSES: ICD-10-CM

## 2021-06-03 DIAGNOSIS — I13.0 HYPERTENSIVE HEART AND CHRONIC KIDNEY DISEASE WITH HEART FAILURE AND STAGE 1 THROUGH STAGE 4 CHRONIC KIDNEY DISEASE, OR CHRONIC KIDNEY DISEASE (HCC): ICD-10-CM

## 2021-06-03 DIAGNOSIS — Z71.89 ACP (ADVANCE CARE PLANNING): ICD-10-CM

## 2021-06-03 DIAGNOSIS — Z00.00 MEDICARE ANNUAL WELLNESS VISIT, SUBSEQUENT: Primary | ICD-10-CM

## 2021-06-03 PROCEDURE — G0439 PPPS, SUBSEQ VISIT: HCPCS | Performed by: NURSE PRACTITIONER

## 2021-06-03 PROCEDURE — 99214 OFFICE O/P EST MOD 30 MIN: CPT | Performed by: NURSE PRACTITIONER

## 2021-06-03 PROCEDURE — 99497 ADVNCD CARE PLAN 30 MIN: CPT | Performed by: NURSE PRACTITIONER

## 2021-06-03 NOTE — PROGRESS NOTES
Sushila Ibanez presents today for   Chief Complaint   Patient presents with    Hypertension    Annual Wellness Visit       Is someone accompanying this pt? Yes, sister Poonam Flores     Is the patient using any DME equipment during [address]? Yes, cane     Depression Screening:  3 most recent PHQ Screens 6/3/2021   Little interest or pleasure in doing things Not at all   Feeling down, depressed, irritable, or hopeless Not at all   Total Score PHQ 2 0   Trouble falling or staying asleep, or sleeping too much -   Feeling tired or having little energy -   Poor appetite, weight loss, or overeating -   Feeling bad about yourself - or that you are a failure or have let yourself or your family down -   Trouble concentrating on things such as school, work, reading, or watching TV -   Thoughts of being better off dead, or hurting yourself in some way -       Learning Assessment:  Learning Assessment 4/7/2021   PRIMARY LEARNER Patient   HIGHEST LEVEL OF EDUCATION - PRIMARY LEARNER  -   BARRIERS PRIMARY LEARNER -   [address]  CAREGIVER -   PRIMARY LANGUAGE ENGLISH   LEARNER PREFERENCE PRIMARY DEMONSTRATION   ANSWERED BY patient   RELATIONSHIP SELF       Fall Risk  Fall Risk Assessment, last 12 mths 5/6/2021   Able to walk? Yes   Fall in past 12 months? 1   Do you feel unsteady? 1   Are you worried about falling 1   Is TUG test greater than 12 seconds? 0   Is the gait abnormal? 1   Number of falls in past 12 months 1   Fall with injury? 0       ADL  No flowsheet data found. Travel Screening:    Travel Screening     Question   Response    In the last month, have you been in contact with someone who was confirmed or suspected to have Coronavirus / COVID-19? No / Unsure    Have you had a COVID-19 viral test in the last 14 days? No    Do you have any of the following new or worsening symptoms? None of these    Have you traveled internationally or domestically in the last month?   No      Travel History   Travel since 05/03/21 No documented travel since 05/03/21          Health Maintenance reviewed and discussed and ordered per Provider. Health Maintenance Due   Topic Date Due    MICROALBUMIN Q1  Never done    COVID-19 Vaccine (1) Never done    DTaP/Tdap/Td series (1 - Tdap) Never done    Shingrix Vaccine Age 50> (1 of 2) Never done    Colorectal Cancer Screening Combo  Never done    Breast Cancer Screen Mammogram  Never done    Bone Densitometry (Dexa) Screening  Never done    Pneumococcal 65+ years (1 of 1 - PPSV23) Never done   . Coordination of Care:  1. Have you been to the ER, urgent care clinic since your last visit? Hospitalized since your last visit? no    2. Have you seen or consulted any other health care providers outside of the [address] [place] [address] since your last visit? Include any pap smears or colon screening.  No

## 2021-06-03 NOTE — PROGRESS NOTES
Advance Care Planning     Advance Care Planning (ACP) Physician/NP/PA Conversation      Date of Conversation: 6/3/2021  Conducted with: Patient with Decision Making Capacity    Healthcare Decision Maker:     Primary Decision Maker: edmund fam - Sister - [phone]    Secondary Decision Maker: Herve Joyce - Daughter - [phone]    Supplemental (Other) Decision Maker: Jean Marie - Son - [phone]  Click here to complete [employer] including selection of the Healthcare Decision Maker Relationship (ie \"Primary\")  Today we documented Decision Maker(s). The patient will provide ACP documents. Care Preferences:    Hospitalization: \"If your health worsens and it becomes clear that your chance of recovery is unlikely, what would be your preference regarding hospitalization? \"  The patient would prefer hospitalization. Ventilation: \"If you were unable to breathe on your own and your chance of recovery was unlikely, what would be your preference about the use of a ventilator (breathing machine) if it was available to you? \"   The patient would desire the use of a ventilator until her son comes down from [place] to see her     Resuscitation: \"In the event your heart stopped as a result of an underlying serious health condition, would you want attempts to be made to restart your heart, or would you prefer a natural death? \"   Yes, attempt to resuscitate.     Conversation Outcomes / Follow-Up Plan:   ACP in process - information provided, considering goals and options    Length of Voluntary ACP Conversation in minutes:  16 minutes    Laure Kenny NP

## 2021-06-03 NOTE — PATIENT INSTRUCTIONS
Medicare Wellness Visit, Female The best way to live healthy is to have a lifestyle where you eat a well-balanced diet, exercise regularly, limit alcohol use, and quit all forms of tobacco/nicotine, if applicable. Regular preventive services are another way to keep healthy. Preventive services (vaccines, screening tests, monitoring & exams) can help personalize your care plan, which helps you manage your own care. Screening tests can find health problems at the earliest stages, when they are easiest to treat. [facility] follows the current, evidence-based guidelines published by the [facility] ([facility]) when recommending preventive services for our patients. Because we follow these guidelines, sometimes recommendations change over time as research supports it. (For example, mammograms used to be recommended annually. Even though Medicare will still pay for an annual mammogram, the newer guidelines recommend a mammogram every two years for women of average risk). Of course, you and your doctor may decide to screen more often for some diseases, based on your risk and your co-morbidities (chronic disease you are already diagnosed with). Preventive services for you include: - Medicare offers their members a free annual wellness visit, which is time for you and your primary care provider to discuss and plan for your preventive service needs. Take advantage of this benefit every year! 
-All adults over the age of 72 should receive the recommended pneumonia vaccines. Current USPSTF guidelines recommend a series of two vaccines for the best pneumonia protection.  
-All adults should have a flu vaccine yearly and a tetanus vaccine every 10 years.  
-All adults age 48 and older should receive the shingles vaccines (series of two vaccines).      
-All adults age 38-68 who are overweight should have a diabetes screening test once every three years.  
-All adults born between [birth year] and 1965 should be screened once for Hepatitis C. 
-Other screening tests and preventive services for persons with diabetes include: an eye exam to screen for diabetic retinopathy, a kidney function test, a foot exam, and stricter control over your cholesterol.  
-Cardiovascular screening for adults with routine risk involves an electrocardiogram (ECG) at intervals determined by your doctor.  
-Colorectal cancer screenings should be done for adults age 54-65 with no increased risk factors for colorectal cancer. There are a number of acceptable methods of screening for this type of cancer. Each test has its own benefits and drawbacks. Discuss with your doctor what is most appropriate for you during your annual wellness visit. The different tests include: colonoscopy (considered the best screening method), a fecal occult blood test, a fecal DNA test, and sigmoidoscopy. 
 
-A bone mass density test is recommended when a woman turns 65 to screen for osteoporosis. This test is only recommended one time, as a screening. Some providers will use this same test as a disease monitoring tool if you already have osteoporosis. -Breast cancer screenings are recommended every other year for women of normal risk, age 54-69. 
-Cervical cancer screenings for women over age 72 are only recommended with certain risk factors. Here is a list of your current Health Maintenance items (your personalized list of preventive services) with a due date: 
Health Maintenance Due Topic Date Due  
 Albumin Urine Test  Never done  COVID-19 Vaccine (1) Never done  DTaP/Tdap/Td  (1 - Tdap) Never done  Shingles Vaccine (1 of 2) Never done  Colorectal Screening  Never done  Mammogram  Never done  Bone Mineral Density   Never done  Pneumococcal Vaccine (1 of 1 - PPSV23) Never done

## 2021-06-03 NOTE — PROGRESS NOTES
This is the Subsequent Medicare Annual Wellness Exam, performed 12 months or more after the Initial AWV or the last Subsequent AWV    I have reviewed the patient's medical history in detail and updated the computerized patient record. Assessment/Plan   Education and counseling provided:  Are appropriate based on today's review and evaluation  End-of-Life planning (with patient's consent)    1. Medicare annual wellness visit, subsequent  2. ACP (advance care planning)  -     REFERRAL TO ACP CLINICAL SPECIALIST    Follow-up and Dispositions    · Return in about 2 months (around 8/3/2021) for diabetes, cholesterol, CKD, CHF, lab results. Depression Risk Factor Screening     3 most recent PHQ Screens 6/3/2021   Little interest or pleasure in doing things Not at all   Feeling down, depressed, irritable, or hopeless Not at all   Total Score PHQ 2 0   Trouble falling or staying asleep, or sleeping too much -   Feeling tired or having little energy -   Poor appetite, weight loss, or overeating -   Feeling bad about yourself - or that you are a failure or have let yourself or your family down -   Trouble concentrating on things such as school, work, reading, or watching TV -   Thoughts of being better off dead, or hurting yourself in some way -       Alcohol Risk Screen    Do you average more than 1 drink per night or more than 7 drinks a week:  No    On any one occasion in the past three months have you have had more than 3 drinks containing alcohol:  No      Functional Ability and Level of Safety    Hearing: Hearing is good. Activities of Daily Living: The home contains: handrails  Patient does total self care      Ambulation: with mild difficulty, uses a cane at home     Fall Risk:  Fall Risk Assessment, last 12 mths 5/6/2021   Able to walk? Yes   Fall in past 12 months? 1   Do you feel unsteady? 1   Are you worried about falling 1   Is TUG test greater than 12 seconds?  0   Is the gait abnormal? 1 Number of falls in past 12 months 1   Fall with injury?  0      Abuse Screen:  Patient is not abused     Cognitive Screening    Has your family/caregiver stated any concerns about your memory: yes - workup with neuropsych pending     Health Maintenance Due     Health Maintenance Due   Topic Date Due    MICROALBUMIN Q1  Never done    COVID-19 Vaccine (1) Never done    DTaP/Tdap/Td series (1 - Tdap) Never done    Shingrix Vaccine Age 50> (1 of 2) Never done    Colorectal Cancer Screening Combo  Never done    Breast Cancer Screen Mammogram  Never done    Bone Densitometry (Dexa) Screening  Never done    Pneumococcal 65+ years (1 of 1 - PPSV23) Never done     Health Maintenance:  COVID-19 vac - recommended, will go today  Pneumonia vac - recommended, declines  TDAP - previously received at [facility], a couple of mos ago  Shingles vac - recommended  Colorectal cancer screening - referred previously, advised to schedule  Mammogram -scheduled on 06/09/2021  DEXA - scheduled on 06/09/2021    Patient Care Team   Patient Care Team:  Maddie Pruitt NP as PCP - General (Nurse Practitioner)  Maddie Pruitt NP as PCP - [facility] Empaneled Provider    History     Patient Active Problem List   Diagnosis Code    Type 2 diabetes mellitus with hyperglycemia ([address] E11.65    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    Diastolic CHF, chronic ([address] I50.32    Hyperlipidemia associated with type 2 diabetes mellitus ([address] E11.69, E78.5    Hypertensive heart and chronic kidney disease with heart failure and stage 1 through stage 4 chronic kidney disease, or chronic kidney disease ([address] I13.0    Stage 3a chronic kidney disease ([address] N18.31    Normocytic anemia D64.9    Rheumatoid arthritis ([address] M06.9    Type 2 diabetes mellitus with peripheral neuropathy ([address] E11.42    Gastroesophageal reflux disease without esophagitis K21.9    Centrilobular emphysema ([address] J43.2    Bilateral femoral artery stenosis ([address] I70.203    Non-proliferative diabetic retinopathy, both eyes ([facility] C54.3359    Memory loss R41.3     Past Medical History:   Diagnosis Date    Arthritis     Autoimmune disease [facility]     RA    Congestive heart failure (HCC)     Neuropathy     Seizures (HCC)       Past Surgical History:   Procedure Laterality Date    HX BREAST BIOPSY Bilateral     HX CATARACT REMOVAL       Current Outpatient Medications   Medication Sig Dispense Refill    losartan (COZAAR) 100 mg tablet Take 1 Tab by mouth daily. Indications: high blood pressure 90 Tab 0    hydrocortisone (CORTEF) 10 mg tablet Take 1 Tab by mouth two (2) times a day. 180 Tab 0    amLODIPine (NORVASC) 10 mg tablet Take 1 Tab by mouth daily. Indications: high blood pressure 30 Tab 6    gabapentin (NEURONTIN) 300 mg capsule Take 1 Cap by mouth three (3) times daily. Max Daily Amount: 900 mg. 90 Cap 0    rosuvastatin (CRESTOR) 20 mg tablet Take 1 Tab by mouth nightly. 90 Tab 0    insulin detemir U-100 (Levemir FlexTouch U-100 Insuln) 100 unit/mL (3 mL) inpn 40 Units by SubCUTAneous route every twelve (12) hours. Indications: type 2 diabetes mellitus 3 mL 0    furosemide (LASIX) 40 mg tablet Take 1 Tab by mouth daily. 30 Tab 6    methotrexate (RHEUMATREX) 2.5 mg tablet Take 15 mg by mouth every Monday.  folic acid (FOLVITE) 1 mg tablet Take 1 mg by mouth daily.  aspirin delayed-release 81 mg tablet Take 81 mg by mouth daily.  glucose blood VI test strips (ASCENSIA AUTODISC VI, ONE TOUCH ULTRA TEST VI) strip Freestyle Lite Strips; ICD 10 Code: E 11.9 Type 2 Diabetes; On Insulin: Yes Z279.4 Long Term (current) Insulin Use.  Blood-Glucose Meter misc Freestyle Freedom Lite Blood Glucose Meter; E 11.9 Type 2 Diabetes; On Insulin: Yes Z279.4 Long Term (current) Insulin Use.  albuterol (PROVENTIL HFA, VENTOLIN HFA, PROAIR HFA) 90 mcg/actuation inhaler Take 2 Puffs by inhalation every six (6) hours as needed.       insulin aspart U-100 (NOVOLOG) 100 unit/mL injection 10 Units by SubCUTAneous route Before breakfast, lunch, and dinner.  lancets misc Trueplus Lancets; ICD 10 Code: E 11.9 Type 2 Diabetes; On Insulin: Yes Z279.4 Long Term (current) Insulin Use. Allergies   Allergen Reactions    Codeine Other (comments)     Patient does not like the reaction towards her body.        Family History   Problem Relation Age of Onset    Hypertension Mother     Hypertension Father      Social History     Tobacco Use    Smoking status: Former Smoker     Packs/day: 0.50     Years: 50.00     Pack years: 25.00     Types: Cigarettes     Quit date: 2009     Years since quittin.4    Smokeless tobacco: Never Used   Substance Use Topics    Alcohol use: Never       Jolynn Pallas, FNP-C

## 2021-06-03 NOTE — TELEPHONE ENCOUNTER
Called Dr. Michelle Zamora rheumatology office, spoke with Freeman Soliz who states that patient referral was received on 5/6/2021 however nothing was done. She stated that she will have the coordinator take a look at it and will give patient sister a call. Sister number was provided.

## 2021-06-04 NOTE — TELEPHONE ENCOUNTER
Please have patient call her insurance regarding in-network for rheumatology and endocrinology. Have her call us back with the information so that we can place referral.  Thank you.

## 2021-06-08 ENCOUNTER — TELEPHONE (OUTPATIENT)
Dept: FAMILY MEDICINE CLINIC | Age: 71
End: 2021-06-08

## 2021-06-08 NOTE — TELEPHONE ENCOUNTER
Called and spoke with patient sister in regards to referrals. Patient sister Anjelica Helton was informed that majority of places that her referrals are being sent to are being denied due to insurance. Ms. Alondra Arias was informed that her sister needs to see endocrinologist ASAP however, we need Ms. Alondra Arias to call insurance to make sure her insurance is still active.  LEAH Jones All made aware

## 2021-06-08 NOTE — TELEPHONE ENCOUNTER
Please follow-up with the referral for endo and rheumatology to make sure patient has been called for appointment; if not, please go ahead and make the appt for the patient. Thanks.

## 2021-06-09 NOTE — TELEPHONE ENCOUNTER
Due to issues with obtaining appointments for patient, Patient sister will have to call patient insurance due to plan. Ms. Wynn Carolyn ( patient sister) is already aware.

## 2021-06-10 ENCOUNTER — OFFICE VISIT (OUTPATIENT)
Dept: NEUROLOGY | Age: 71
End: 2021-06-10

## 2021-06-10 ENCOUNTER — DOCUMENTATION ONLY (OUTPATIENT)
Dept: FAMILY MEDICINE CLINIC | Facility: CLINIC | Age: 71
End: 2021-06-10

## 2021-06-10 DIAGNOSIS — R41.3 MEMORY LOSS: Primary | ICD-10-CM

## 2021-06-10 DIAGNOSIS — F41.8 ANXIETY ABOUT HEALTH: ICD-10-CM

## 2021-06-10 NOTE — ACP (ADVANCE CARE PLANNING)
Advance Care Planning   Ambulatory ACP Specialist Patient Outreach    Date:  6/10/2021    ACP Specialist:  Vanessa Bates RN    Outreach call to patient in follow-up to ACP Specialist referral from:    [x] PCP  [] Provider   [] Ambulatory Care Management [] Other     For:                  [x] Continued Conversation for ACP decision making / Goals of Care             [] Code Status Discussion             [x] Completion of Adv Directive             [] Completion of Portable DNR order             [] Other (Specify)    Date Referral Received: 6/4/21    Today's Outreach:  [x] First   [x] Second  [] Third                                           Third outreach made by []  phone  [] email []   [employer]     Intervention:  [] Spoke with Patient   [] Left VM requesting return call   [x] No answer/unable to leave message; Attempts made 6/10, 6/14       Next Step:   [] ACP scheduled conversation  [x] Outreach again next week             [] Email / Mail [address] [place]  [] Email / Mail Advance Directive   []  Closing referral.  Routing closure to referring provider/staff and to ACP Specialist .      Thank you for this referral.

## 2021-06-11 ENCOUNTER — TELEPHONE (OUTPATIENT)
Dept: FAMILY MEDICINE CLINIC | Age: 71
End: 2021-06-11

## 2021-06-11 NOTE — TELEPHONE ENCOUNTER
Spoke to patient's caregiver, Ms. Reshma Garcia (sister) to remind her about patient's [facility] appointment today. She states that she is on her way out of town and the patient is staying at her daughter's house, therefore will not make it to today's appointment. Ms. Reshma Garcia states she will not be back until Monday. Patient was recently admitted for DKA, syncope/collapse, seizure activity. Upon review of discharge summary, it appears that there are some inconsistencies with patient's medication reconciliation. Patient was discharged on 16 units of Levemir and 10 units of Novolog TID. Of note, patient was on 40 units BID of Levemir. There has been confusion with patient's insurance coverage and therefore she had not been able to see any specialists. Francesco Stone PharmD agrees to manage patient's diabetes until she is seen by endocrine, once insurance coverage has been clarified. Will attempt to contact patient today by phone for her [facility] appointment, in order to clarify medication changes at discharge. Addendum:  Attempted to contact patient, no answer, voicemail is full. Will contact patient's sister on Monday to reschedule [facility] appointment on Tuesday. FYI sent to Francesco Stone PharmD.

## 2021-06-14 NOTE — TELEPHONE ENCOUNTER
Unable to reach patient's sister, Ms. Martines Seen, to schedule appointment with PharmD. Was able to leave voicemail at [phone]. Will try again tomorrow. Thank you,  Agustin Chavez.  Trey Reeves, NAYELIS

## 2021-06-14 NOTE — PROGRESS NOTES
Patient arrived for testing but due to slow time to completion and lengthy battery of tests, an additional day of testing is required. Patient will return to complete; report to follow once testing, scoring, and interpretation completed. Patient arrived for testing but due to slow time to completion and lengthy battery of tests, an additional day of testing is required. Patient will return to complete; report to follow once testing, scoring, and interpretation completed.

## 2021-06-15 NOTE — PROGRESS NOTES
Jeffry Lopez presents today for   Chief Complaint   Patient presents with    Establish Care    Diabetes    Anemia       Virtual/telephone visit    Depression Screening:  3 most recent PHQ Screens 3/11/2021   Little interest or pleasure in doing things Not at all   Feeling down, depressed, irritable, or hopeless Several days   Total Score PHQ 2 1       Learning Assessment:  Learning Assessment 3/11/2021   PRIMARY LEARNER Patient   HIGHEST LEVEL OF EDUCATION - PRIMARY LEARNER  2 YEARS OF COLLEGE   BARRIERS PRIMARY LEARNER NONE   CO-LEARNER CAREGIVER No   PRIMARY LANGUAGE ENGLISH   LEARNER PREFERENCE PRIMARY DEMONSTRATION   ANSWERED BY patient   RELATIONSHIP SELF       Fall Risk  Fall Risk Assessment, last 12 mths 3/11/2021   Able to walk? Yes   Fall in past 12 months? 1   Do you feel unsteady? 1   Are you worried about falling 1   Is TUG test greater than 12 seconds? 0   Is the gait abnormal? 1   Number of falls in past 12 months 2   Fall with injury? 0       ADL  No flowsheet data found. Health Maintenance reviewed and discussed and ordered per Provider. There are no preventive care reminders to display for this patient. .      Coordination of Care:  1. Have you been to the ER, urgent care clinic since your last visit? Hospitalized since your last visit? Yes, [facility] ER, 3/5/21-3/6/21, ketoacidosis and heart failure     2. Have you seen or consulted any other health care providers outside of the [address] [place] [address] since your last visit? Include any pap smears or colon screening.  no Name band;

## 2021-06-16 NOTE — TELEPHONE ENCOUNTER
Scheduled patient for 6/30/21 - Mrs. Hilbert Meckel states that she will be out of town all next week, so could not schedule her sooner. Notified Mrs. Hilbert Meckel that LEAH Field would like to see Ms. Prema Hearn this week if possible and she said that would be fine. Will notify PCP office to call Mrs. Hilbert Meckel today for scheduling at [phone]; she should be expecting the call. Thank you,  Abhilash Saleh.  Myrna Santoyo, NAYLEIS

## 2021-06-16 NOTE — PROGRESS NOTES
Transitional Care Management Progress Note    Patient: Danii Morris  : 1950  PCP: Koki Bah NP    Date of admission: 2021  Date of discharge: 2021    Patient was contacted by Transitional Care Management services within two days after her discharge: Yes. This encounter and supporting documentation was reviewed if available. Medication reconciliation was performed today (2021). Assessment/Plan:     1. Hospital discharge follow-up  -     RI DISCHARGE MEDS RECONCILED W/ CURRENT OUTPATIENT MED LIST  2. Syncope and collapse  Comments: Follow-up with neurology as scheduled  3. Seizure-like activity (HCC)  Comments:  Resolved, follow-up with neurology as scheduled  4. Diabetic ketoacidosis without coma associated with type 2 diabetes mellitus ([facility]  Comments:  Resolved  Orders:  -     [address]  5. Type 2 diabetes mellitus with hyperglycemia, with long-term current use of insulin (HCC)  Assessment & Plan:  Much better controlled after hospitalization and insulin adjustments  Continue 16 units of Levemir nightly, 12 units of Novolog TID   Follow-up with pharmD as scheduled  Call health insurance to inquire about endocrine within network, patient's sister to get back to me with name and we will then place referral  Orders:  -     [address]  -     insulin detemir U-100 (Levemir FlexTouch U-100 Insuln) 100 unit/mL (3 mL) inpn; 16 Units by SubCUTAneous route Daily (before dinner). Indications: type 2 diabetes mellitus, Normal, Disp-5 Adjustable Dose Pre-filled Pen Syringe, R-1  6. Type 2 diabetes mellitus with peripheral neuropathy (HCC)  Assessment & Plan:  Diabetes uncontrolled but improved home readings since hospital discharge, continue regimen as above  Continue Gabapentin for neuropathy  Orders:  -     [address]  7.  Hyperlipidemia associated with type 2 diabetes mellitus ([facility]  Assessment & Plan:  LDL goal <70, continue regimen  Complete fasting labs 1 week prior to next follow-up  8. Hypertensive heart and chronic kidney disease with heart failure and stage 1 through stage 4 chronic kidney disease, or chronic kidney disease ([facility] [address]  Assessment & Plan:  BP elevated, goal <130/80, monitor for now and follow-up as scheduled in 6 weeks  Advised to call cardiology for consistent home readings above 140/90  Home health ordered for medication management and blood pressure checks  9. Diastolic CHF, chronic (HCC)  Assessment & Plan:  Stable, continue regimen per cards  Orders:  -     [address] [place]  10. Palpitations  Comments:  Advised to call cardiology if persists  11. Rheumatoid arthritis, involving unspecified site, unspecified whether rheumatoid factor present [facility])  Assessment & Plan:  Continue regimen, patient to contact her insurance to inquire about in-network rheumatologist, will get back to me with name and we will then place referral  12. Centrilobular emphysema ([facility]  Assessment & Plan:  Stable on regimen, continue  13. [place]'s disease [facility])  Assessment & Plan:  Continue steroids, watch BG closely  Referral to rheumatology will be placed once patient contacts her insurance for in-network providers    Follow-up and Dispositions    · Return in about 6 weeks (around 7/29/2021) for diabetes, blood pressure, rheumatoid arthritis, jinny's disease, decreased pedal pulses. Subjective:   Jimmie Patel is a 79 y.o. female presenting today for follow-up after being discharged from [facility]. The discharge summary was reviewed or requested. The main problem requiring admission was syncope, hyperglycemia. Complications during admission:  See dx list    Hospital Course Per Discharge Summary:  Caryl Cox is a 79 y.o. female with PMH of DM, seizure disorder, RA, COPD, HLD, CHF, & jinny's disease who presented to the ED via EMS with syncope.  Initial workup significant for hyperglycemia, she was referred to IM for admission, further evaluation, and continued medical management. Upon assessment Ms. Jean-Paul Peacock reports that she \"fell out\" at home while in in the bathroom, prior to falling she felt dizzy. She reports intermittent dizziness x several days. She also reports her blood sugar has been \"very high\". She states \"My sister said I had a seizure\". She was also noted to have seizure like activity while in the ED. She denies any fever, cough, chills, SOB, CP, N/V/D, urinary symptoms, or sick contacts. Hospital course problem based  Uncontrolled diabetes mellitus type 2 with Hyperglycemia  Blood sugar readings in acceptable range on Glucomander  Patient on insulin Glucomander transition to subcutaneous insulin   Blood sugar level elevated increase the dose of long-acting insulin. Insulin dose adjusted  And prescription for Levemir sent to the pharmacy        Syncope and collapse  Status post fallPOA  likely etiology of syncope was vasovagal in nature   Patient denies any chest pain or shortness of breath  Echocardiogram reviewed  PT/OT evals home health therapy     Seizure disorder  Seizure like activity while in ED  Continue Keppra for now   Seizure precautions      Essential Hypertension- poor control   Continue Norvasc      Chronic combined systolic/diastolic heart failure  Echo reviewed- EF 46% 2/20  Hold lasix for now      Rheumatoid arthritis  Continue methotrexate     HLD  Continue statin      COPD without acute exacerbation   PRN IBD     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 Patient also educated about monitoring of the blood sugar level at home and compliant with the medications and diet as all    Patient presents today for hospital follow-up. Was told that she was taking too much Levemir and dose was decreased to 16 units every evening, but was told she can take an extra dose in the afternoon, if her BG was elevated.   BG at home has been running much better, in the 150s or less. New Medications at Discharge:  None    Changed Medications at Discharge:  Levemir 16 units every evening  Novolog 12 units TID with meals    Continued Medications at Discharge:  Albuterol  Amlodipine 5 mg -incorrect dose (should be 10 mg)  ASA 81 mg  Ergocalciferol 50,000 units weekly  Folic acid  Furosemide 40 mg daily  Gabapentin 300 mg TID  Hydrocortisone 10 mg tab BID  Novolog 10 units TID before meals  Lidocaine patch   Losartan 25 mg daily - incorrect dose (should be 100 mg)  Methotrexate 2.5 mg 6 tabs every Monday  Ondasetron 8 mg Q8H PRN  Pantoprazole 40 mg daily - incorrect (previously discontinued)  Rosuvastatin 10 mg QHS - incorrect (should be 20 mg)  Incruse Ellipta 62.5 1 puff daily    Interval history/Current status: Guarded    Admitting symptoms have: improved    Medications marked \"taking\" at this time:  Home Medications    Medication Sig Start Date End Date Taking? Authorizing Provider   insulin detemir U-100 (Levemir FlexTouch U-100 Insuln) 100 unit/mL (3 mL) inpn 16 Units by SubCUTAneous route Daily (before dinner). Indications: type 2 diabetes mellitus 6/17/21  Yes Ebony Neal NP   losartan (COZAAR) 100 mg tablet Take 1 Tab by mouth daily. Indications: high blood pressure 5/6/21  Yes Ebony Neal NP   hydrocortisone (CORTEF) 10 mg tablet Take 1 Tab by mouth two (2) times a day. 4/9/21  Yes Aaron Higgins NP   amLODIPine (NORVASC) 10 mg tablet Take 1 Tab by mouth daily. Indications: high blood pressure 4/8/21  Yes Ebony Neal NP   gabapentin (NEURONTIN) 300 mg capsule Take 1 Cap by mouth three (3) times daily. Max Daily Amount: 900 mg. 4/8/21  Yes Aaron Higgins NP   rosuvastatin (CRESTOR) 20 mg tablet Take 1 Tab by mouth nightly. 4/8/21  Yes Aaron Higgins NP   furosemide (LASIX) 40 mg tablet Take 1 Tab by mouth daily.  4/7/21  Yes Arabella Hess DO   methotrexate (RHEUMATREX) 2.5 mg tablet Take 15 mg by mouth every Monday. 2/8/21  Yes Provider, Historical   folic acid (FOLVITE) 1 mg tablet Take 1 mg by mouth daily. Yes Provider, Historical   aspirin delayed-release 81 mg tablet Take 81 mg by mouth daily. Yes Provider, Historical   glucose blood VI test strips (ASCENSIA AUTODISC VI, ONE TOUCH ULTRA TEST VI) strip Freestyle Lite Strips; ICD 10 Code: E 11.9 Type 2 Diabetes; On Insulin: Yes Z279.4 Long Term (current) Insulin Use. 2/2/21  Yes Provider, Historical   Blood-Glucose Meter misc Freestyle Freedom Lite Blood Glucose Meter; E 11.9 Type 2 Diabetes; On Insulin: Yes Z279.4 Long Term (current) Insulin Use. 2/2/21  Yes Provider, Historical   albuterol (PROVENTIL HFA, VENTOLIN HFA, PROAIR HFA) 90 mcg/actuation inhaler Take 2 Puffs by inhalation every six (6) hours as needed. 2/2/21  Yes Provider, Historical   insulin aspart U-100 (NOVOLOG) 100 unit/mL injection 12 Units by SubCUTAneous route Before breakfast, lunch, and dinner. 2/2/21  Yes Provider, Historical   lancets misc Trueplus Lancets; ICD 10 Code: E 11.9 Type 2 Diabetes; On Insulin: Yes Z279.4 Long Term (current) Insulin Use. 2/2/21  Yes Provider, Historical   insulin detemir U-100 (Levemir FlexTouch U-100 Insuln) 100 unit/mL (3 mL) inpn 16 Units by SubCUTAneous route Daily (before dinner). Indications: type 2 diabetes mellitus 6/17/21 6/17/21  Demetrius Chacon NP   insulin detemir U-100 (Levemir FlexTouch U-100 Insuln) 100 unit/mL (3 mL) inpn 40 Units by SubCUTAneous route every twelve (12) hours.  Indications: type 2 diabetes mellitus 4/8/21 6/17/21  Demetrius Chacon NP          Patient Active Problem List   Diagnosis Code    Type 2 diabetes mellitus with hyperglycemia ([facility] [address] E11.65    [place]'s disease ([facility] J86.0    Diastolic CHF, chronic (HCC) I50.32    Hyperlipidemia associated with type 2 diabetes mellitus ([facility] E11.69, E78.5    Hypertensive heart and chronic kidney disease with heart failure and stage 1 through stage 4 chronic kidney disease, or chronic kidney disease ([facility]) I13.0    Stage 3a chronic kidney disease ([facility]) N18.31    Normocytic anemia D64.9    Rheumatoid arthritis ([facility]) M06.9    Type 2 diabetes mellitus with peripheral neuropathy ([facility]) E11.42    Gastroesophageal reflux disease without esophagitis K21.9    Centrilobular emphysema ([facility]) J43.2    Bilateral femoral artery stenosis ([facility]) I70.203    Non-proliferative diabetic retinopathy, both eyes ([facility]) G30.3161    Memory loss R41.3     Current Outpatient Medications   Medication Sig Dispense Refill    insulin detemir U-100 (Levemir FlexTouch U-100 Insuln) 100 unit/mL (3 mL) inpn 16 Units by SubCUTAneous route Daily (before dinner). Indications: type 2 diabetes mellitus 5 Adjustable Dose Pre-filled Pen Syringe 1    losartan (COZAAR) 100 mg tablet Take 1 Tab by mouth daily. Indications: high blood pressure 90 Tab 0    hydrocortisone (CORTEF) 10 mg tablet Take 1 Tab by mouth two (2) times a day. 180 Tab 0    amLODIPine (NORVASC) 10 mg tablet Take 1 Tab by mouth daily. Indications: high blood pressure 30 Tab 6    gabapentin (NEURONTIN) 300 mg capsule Take 1 Cap by mouth three (3) times daily. Max Daily Amount: 900 mg. 90 Cap 0    rosuvastatin (CRESTOR) 20 mg tablet Take 1 Tab by mouth nightly. 90 Tab 0    furosemide (LASIX) 40 mg tablet Take 1 Tab by mouth daily. 30 Tab 6    methotrexate (RHEUMATREX) 2.5 mg tablet Take 15 mg by mouth every Monday.  folic acid (FOLVITE) 1 mg tablet Take 1 mg by mouth daily.  aspirin delayed-release 81 mg tablet Take 81 mg by mouth daily.  glucose blood VI test strips (ASCENSIA AUTODISC VI, ONE TOUCH ULTRA TEST VI) strip Freestyle Lite Strips; ICD 10 Code: E 11.9 Type 2 Diabetes; On Insulin: Yes Z279.4 Long Term (current) Insulin Use.  Blood-Glucose Meter misc Freestyle Freedom Lite Blood Glucose Meter; E 11.9 Type 2 Diabetes; On Insulin: Yes Z279.4 Long Term (current) Insulin Use.       albuterol (PROVENTIL HFA, VENTOLIN HFA, PROAIR HFA) 90 mcg/actuation inhaler Take 2 Puffs by inhalation every six (6) hours as needed.  insulin aspart U-100 (NOVOLOG) 100 unit/mL injection 12 Units by SubCUTAneous route Before breakfast, lunch, and dinner.  lancets misc Trueplus Lancets; ICD 10 Code: E 11.9 Type 2 Diabetes; On Insulin: Yes Z279.4 Long Term (current) Insulin Use. Allergies   Allergen Reactions    Codeine Other (comments)     Patient does not like the reaction towards her body. Past Medical History:   Diagnosis Date    Arthritis     Autoimmune disease ([facility]     RA    Congestive heart failure (HCC)     Neuropathy     Seizures ([facility])       Social History     Socioeconomic History    Marital status: SINGLE     Spouse name: Not on file    Number of children: Not on file    Years of education: Not on file    Highest education level: Not on file   Occupational History    Not on file   Tobacco Use    Smoking status: Former Smoker     Packs/day: 0.50     Years: 50.00     Pack years: 25.00     Types: Cigarettes     Quit date: 2009     Years since quittin.4    Smokeless tobacco: Never Used   Vaping Use    Vaping Use: Never used   Substance and Sexual Activity    Alcohol use: Never    Drug use: Never    Sexual activity: Not on file   Other Topics Concern    Not on file   Social History Narrative    Not on file     Social Determinants of Health     Financial Resource Strain:     Difficulty of Paying Living Expenses:    Food Insecurity:     Worried About [address] in the Last Year:     [address] in the Last Year:    Transportation Needs:     Lack of Transportation (Medical):      Lack of Transportation (Non-Medical):    Physical Activity:     Days of Exercise per Week:     Minutes of Exercise per Session:    Stress:     Feeling of Stress :    Social Connections:     Frequency of Communication with Friends and Family:     Frequency of Social Gatherings with Friends and Family:     Attends Sabianist Services:     Active Member of Clubs or Organizations:     Attends Club or Organization Meetings:     Marital Status:    Intimate Partner Violence:     Fear of Current or Ex-Partner:     Emotionally Abused:     Physically Abused:     Sexually Abused:       Past Surgical History:   Procedure Laterality Date    HX BREAST BIOPSY Bilateral     HX CATARACT REMOVAL        Family History   Problem Relation Age of Onset    Hypertension Mother     Hypertension Father         Review of Systems   Constitutional: Negative for chills, fever and malaise/fatigue. Respiratory: Positive for shortness of breath. Cardiovascular: Positive for palpitations. Negative for chest pain. Genitourinary: Negative for dysuria, frequency and urgency. Neurological: Negative for dizziness, tingling and headaches. Objective:   BP (!) 155/76   Pulse 80   Temp 97.5 °F (36.4 °C) (Temporal)   Resp 20   Ht 5' 6\" (1.676 m)   Wt 155 lb 9.6 oz (70.6 kg)   SpO2 95%   BMI 25.11 kg/m²      Physical Exam  Constitutional:       Appearance: Normal appearance. She is not ill-appearing. HENT:      Head: Normocephalic and atraumatic. Cardiovascular:      Rate and Rhythm: Normal rate and regular rhythm. Heart sounds: No murmur heard. No friction rub. No gallop. Pulmonary:      Effort: Pulmonary effort is normal. No respiratory distress. Breath sounds: Normal breath sounds. No wheezing, rhonchi or rales. Musculoskeletal:         General: Normal range of motion. Cervical back: Normal range of motion. Skin:     General: Skin is warm and dry. Neurological:      General: No focal deficit present. Mental Status: She is alert and oriented to person, place, and time. Psychiatric:         Mood and Affect: Affect is flat.         Ozzie Irwin NP

## 2021-06-17 ENCOUNTER — OFFICE VISIT (OUTPATIENT)
Dept: FAMILY MEDICINE CLINIC | Age: 71
End: 2021-06-17
Payer: MEDICARE

## 2021-06-17 VITALS
HEIGHT: 66 IN | BODY MASS INDEX: 25.01 KG/M2 | OXYGEN SATURATION: 95 % | WEIGHT: 155.6 LBS | HEART RATE: 80 BPM | DIASTOLIC BLOOD PRESSURE: 76 MMHG | RESPIRATION RATE: 20 BRPM | SYSTOLIC BLOOD PRESSURE: 155 MMHG | TEMPERATURE: 97.5 F

## 2021-06-17 DIAGNOSIS — J43.2 CENTRILOBULAR EMPHYSEMA (HCC): ICD-10-CM

## 2021-06-17 DIAGNOSIS — I13.0 HYPERTENSIVE HEART AND CHRONIC KIDNEY DISEASE WITH HEART FAILURE AND STAGE 1 THROUGH STAGE 4 CHRONIC KIDNEY DISEASE, OR CHRONIC KIDNEY DISEASE (HCC): ICD-10-CM

## 2021-06-17 DIAGNOSIS — E27.1 ADDISON'S DISEASE (HCC): ICD-10-CM

## 2021-06-17 DIAGNOSIS — E11.42 TYPE 2 DIABETES MELLITUS WITH PERIPHERAL NEUROPATHY (HCC): ICD-10-CM

## 2021-06-17 DIAGNOSIS — E11.10 DIABETIC KETOACIDOSIS WITHOUT COMA ASSOCIATED WITH TYPE 2 DIABETES MELLITUS (HCC): ICD-10-CM

## 2021-06-17 DIAGNOSIS — I50.32 DIASTOLIC CHF, CHRONIC (HCC): ICD-10-CM

## 2021-06-17 DIAGNOSIS — Z09 HOSPITAL DISCHARGE FOLLOW-UP: Primary | ICD-10-CM

## 2021-06-17 DIAGNOSIS — M06.9 RHEUMATOID ARTHRITIS, INVOLVING UNSPECIFIED SITE, UNSPECIFIED WHETHER RHEUMATOID FACTOR PRESENT (HCC): ICD-10-CM

## 2021-06-17 DIAGNOSIS — E11.69 HYPERLIPIDEMIA ASSOCIATED WITH TYPE 2 DIABETES MELLITUS (HCC): ICD-10-CM

## 2021-06-17 DIAGNOSIS — E11.65 TYPE 2 DIABETES MELLITUS WITH HYPERGLYCEMIA, WITH LONG-TERM CURRENT USE OF INSULIN (HCC): ICD-10-CM

## 2021-06-17 DIAGNOSIS — Z79.4 TYPE 2 DIABETES MELLITUS WITH HYPERGLYCEMIA, WITH LONG-TERM CURRENT USE OF INSULIN (HCC): ICD-10-CM

## 2021-06-17 DIAGNOSIS — E78.5 HYPERLIPIDEMIA ASSOCIATED WITH TYPE 2 DIABETES MELLITUS (HCC): ICD-10-CM

## 2021-06-17 DIAGNOSIS — R00.2 PALPITATIONS: ICD-10-CM

## 2021-06-17 DIAGNOSIS — R56.9 SEIZURE-LIKE ACTIVITY (HCC): ICD-10-CM

## 2021-06-17 DIAGNOSIS — R55 SYNCOPE AND COLLAPSE: ICD-10-CM

## 2021-06-17 PROCEDURE — 1111F DSCHRG MED/CURRENT MED MERGE: CPT | Performed by: NURSE PRACTITIONER

## 2021-06-17 PROCEDURE — 99214 OFFICE O/P EST MOD 30 MIN: CPT | Performed by: NURSE PRACTITIONER

## 2021-06-17 RX ORDER — INSULIN DETEMIR 100 [IU]/ML
16 INJECTION, SOLUTION SUBCUTANEOUS
Qty: 5 ADJUSTABLE DOSE PRE-FILLED PEN SYRINGE | Refills: 1 | Status: SHIPPED | OUTPATIENT
Start: 2021-06-17 | End: 2021-11-24

## 2021-06-17 RX ORDER — INSULIN DETEMIR 100 [IU]/ML
16 INJECTION, SOLUTION SUBCUTANEOUS
Qty: 3 ML | Refills: 0
Start: 2021-06-17 | End: 2021-06-17 | Stop reason: SDUPTHER

## 2021-06-17 NOTE — ASSESSMENT & PLAN NOTE
Continue steroids, watch BG closely  Referral to rheumatology will be placed once patient contacts her insurance for in-network providers

## 2021-06-17 NOTE — PROGRESS NOTES
Sangeeta Boucher presents today for   Chief Complaint   Patient presents with   [facility] Follow Up     patient states see was seen in [place] [address] ER on 6/3/21 for seizure and admitted. Dx with DKA. Discharged on 6/7/21       Is someone accompanying this pt? Yes, sister, Terrance Herbert    Is the patient using any DME equipment during [address]? Yes, cane    Depression Screening:  3 most recent PHQ Screens 6/17/2021   Little interest or pleasure in doing things Not at all   Feeling down, depressed, irritable, or hopeless Not at all   Total Score PHQ 2 0   Trouble falling or staying asleep, or sleeping too much -   Feeling tired or having little energy -   Poor appetite, weight loss, or overeating -   Feeling bad about yourself - or that you are a failure or have let yourself or your family down -   Trouble concentrating on things such as school, work, reading, or watching TV -   Thoughts of being better off dead, or hurting yourself in some way -       Learning Assessment:  Learning Assessment 4/7/2021   PRIMARY LEARNER Patient   HIGHEST LEVEL OF EDUCATION - PRIMARY LEARNER  -   BARRIERS PRIMARY LEARNER -   [address]  CAREGIVER -   PRIMARY LANGUAGE ENGLISH   LEARNER PREFERENCE PRIMARY DEMONSTRATION   ANSWERED BY patient   RELATIONSHIP SELF       Abuse Screening:  Abuse Screening Questionnaire 6/3/2021   Do you ever feel afraid of your partner? N   Are you in a relationship with someone who physically or mentally threatens you? N   Is it safe for you to go home? Y       Fall Screening  Fall Risk Assessment, last 12 mths 5/6/2021   Able to walk? Yes   Fall in past 12 months? 1   Do you feel unsteady? 1   Are you worried about falling 1   Is TUG test greater than 12 seconds? 0   Is the gait abnormal? 1   Number of falls in past 12 months 1   Fall with injury? 0       Generalized Anxiety  No flowsheet data found.      Health Maintenance Due   Topic Date Due    MICROALBUMIN Q1  Never done    COVID-19 Vaccine (1) Never done   Son.Cathie DTaP/Tdap/Td series (1 - Tdap) Never done    Shingrix Vaccine Age 50> (1 of 2) Never done    Colorectal Cancer Screening Combo  Never done    Breast Cancer Screen Mammogram  Never done    Bone Densitometry (Dexa) Screening  Never done    Pneumococcal 65+ years (1 of 1 - PPSV23) Never done   . Health Maintenance reviewed and discussed and ordered per Provider. Coordination of Care  1. Have you been to the ER, urgent care clinic since your last visit? Hospitalized since your last visit? Yes, [facility]    2. Have you seen or consulted any other health care providers outside of the [address] [place] [address] since your last visit? Include any pap smears or colon screening.  no

## 2021-06-17 NOTE — ASSESSMENT & PLAN NOTE
BP elevated, goal <130/80, monitor for now and follow-up as scheduled in 6 weeks  Advised to call cardiology for consistent home readings above 140/90  Home health ordered for medication management and blood pressure checks

## 2021-06-17 NOTE — ASSESSMENT & PLAN NOTE
Diabetes uncontrolled but improved home readings since hospital discharge, continue regimen as above  Continue Gabapentin for neuropathy

## 2021-06-17 NOTE — PATIENT INSTRUCTIONS
REMINDERS: 
 
1. Call insurance to ask who is in-network in the area for both endocrinology and rheumatology. Please call me with the name and address of the specialists so we can place a referral for you.  
 
2.  Don't forget your appointment with Lizzeth Quintanilla, the pharmacist for your diabetes on 06/30/2021 at 1pm.

## 2021-06-17 NOTE — ASSESSMENT & PLAN NOTE
Much better controlled after hospitalization and insulin adjustments  Continue 16 units of Levemir nightly, 12 units of Novolog TID   Follow-up with pharmD as scheduled  Call health insurance to inquire about endocrine within network, patient's sister to get back to me with name and we will then place referral

## 2021-06-18 ENCOUNTER — HOME HEALTH ADMISSION (OUTPATIENT)
Dept: HOME HEALTH SERVICES | Facility: HOME HEALTH | Age: 71
End: 2021-06-18
Payer: MEDICARE

## 2021-06-18 ENCOUNTER — OFFICE VISIT (OUTPATIENT)
Dept: NEUROLOGY | Age: 71
End: 2021-06-18
Payer: MEDICARE

## 2021-06-18 DIAGNOSIS — G31.84 MILD NEUROCOGNITIVE DISORDER: Primary | ICD-10-CM

## 2021-06-18 DIAGNOSIS — F41.9 ANXIETY DISORDER, UNSPECIFIED TYPE: ICD-10-CM

## 2021-06-18 DIAGNOSIS — R45.89 SYMPTOMS OF DEPRESSION: ICD-10-CM

## 2021-06-18 PROCEDURE — 96133 NRPSYC TST EVAL PHYS/QHP EA: CPT | Performed by: PSYCHOLOGIST

## 2021-06-18 PROCEDURE — 96138 PSYCL/NRPSYC TECH 1ST: CPT | Performed by: PSYCHOLOGIST

## 2021-06-18 PROCEDURE — 96132 NRPSYC TST EVAL PHYS/QHP 1ST: CPT | Performed by: PSYCHOLOGIST

## 2021-06-18 PROCEDURE — 96137 PSYCL/NRPSYC TST PHY/QHP EA: CPT | Performed by: PSYCHOLOGIST

## 2021-06-18 PROCEDURE — 96136 PSYCL/NRPSYC TST PHY/QHP 1ST: CPT | Performed by: PSYCHOLOGIST

## 2021-06-18 PROCEDURE — 96139 PSYCL/NRPSYC TST TECH EA: CPT | Performed by: PSYCHOLOGIST

## 2021-06-18 NOTE — PROGRESS NOTES
[place] [facility]  Neuroscience   [address] [facility] [place], [facility] [address]  Office:  [phone]  Fax: [phone]                Neuropsychological Evaluation Report    Patient Name: Queen Isai  Age: 79 y.o. Gender: female   Handedness: left handed   Presenting Concern: memory loss  Primary Care Physician: Tabitha Worley NP  Referring Provider: Tabitha Worley NP    PATIENT HISTORY (OBTAINED DURING INITIAL CLINICAL EVALUATION):    REASON FOR REFERRAL:  This comprehensive and medically necessary neuropsychological assessment was requested to assist a differential diagnosis of memory complaints. The use and purpose of this examination, as well as the extent and limitations of confidentiality, were explained prior to obtaining permission to participate. Instructions were provided regarding the necessity to put forth optimal effort and answer questions truthfully in order to obtain reliable and accurate test results. REVIEW OF RECORDS:  Ms. Loni Jackson was referred by her PCP for a work-up of memory loss. Hospital records note uncontrolled diabetes due to noncompliance with insulin. An EEG on 2/1/2021 showed the following: This is an abnormal EEG. The diffuse background slowing is   consistent with mild encephalopathy.  No epileptiform discharges   or seizures were seen. This does not preclude a diagnosis of   epilepsy. A CT of the head on 1/27/21 showed:  1. No acute intracranial hemorrhage, mass effect, midline shift, or herniation.  No definite CT evidence of acute cortical infarct is seen. 2. Mild nonspecific white matter disease likely representing chronic small vessel changes. Current Outpatient Medications   Medication Sig    insulin detemir U-100 (Levemir FlexTouch U-100 Insuln) 100 unit/mL (3 mL) inpn 16 Units by SubCUTAneous route Daily (before dinner).  Indications: type 2 diabetes mellitus    losartan (COZAAR) 100 mg tablet Take 1 Tab by mouth daily. Indications: high blood pressure    hydrocortisone (CORTEF) 10 mg tablet Take 1 Tab by mouth two (2) times a day.  amLODIPine (NORVASC) 10 mg tablet Take 1 Tab by mouth daily. Indications: high blood pressure    gabapentin (NEURONTIN) 300 mg capsule Take 1 Cap by mouth three (3) times daily. Max Daily Amount: 900 mg.    rosuvastatin (CRESTOR) 20 mg tablet Take 1 Tab by mouth nightly.  furosemide (LASIX) 40 mg tablet Take 1 Tab by mouth daily.  methotrexate (RHEUMATREX) 2.5 mg tablet Take 15 mg by mouth every Monday.  folic acid (FOLVITE) 1 mg tablet Take 1 mg by mouth daily.  aspirin delayed-release 81 mg tablet Take 81 mg by mouth daily.  glucose blood VI test strips (ASCENSIA AUTODISC VI, ONE TOUCH ULTRA TEST VI) strip Freestyle Lite Strips; ICD 10 Code: E 11.9 Type 2 Diabetes; On Insulin: Yes Z279.4 Long Term (current) Insulin Use.  Blood-Glucose Meter misc Freestyle Freedom Lite Blood Glucose Meter; E 11.9 Type 2 Diabetes; On Insulin: Yes Z279.4 Long Term (current) Insulin Use.  albuterol (PROVENTIL HFA, VENTOLIN HFA, PROAIR HFA) 90 mcg/actuation inhaler Take 2 Puffs by inhalation every six (6) hours as needed.  insulin aspart U-100 (NOVOLOG) 100 unit/mL injection 12 Units by SubCUTAneous route Before breakfast, lunch, and dinner.  lancets misc Trueplus Lancets; ICD 10 Code: E 11.9 Type 2 Diabetes; On Insulin: Yes Z279.4 Long Term (current) Insulin Use. No current facility-administered medications for this visit. Past Medical History:   Diagnosis Date    Arthritis     Autoimmune disease ([facility] [address]     RA    Congestive heart failure (HCC)     Neuropathy     Seizures (HCC)          Past Surgical History:   Procedure Laterality Date    HX BREAST BIOPSY Bilateral     HX CATARACT REMOVAL         CLINICAL INTERVIEW:  Ms. Kimo Joseph was accompanied by her sister for her initial interview.   Consistent with records, they reported memory loss which has been worsening over the previous year. Neurologic history is negative for stroke and significant head trauma but positive for syncope secondary to seizures. With regard to seizures, Ms. Loni Jackson was unable to provide a clear history as to the onset and trajectory. Hospital records note at least one seizure. However, Ms. Loni Jackson stated that she has not been referred to neurology. She also stated that she had not had an EEG but one was found in records. Sleep disturbance is significant for hypersomnia. Ms. Loni Jackson sleeps most of the day and all night. That said, she does have some difficulties with sleep maintenance. There is no known snoring or previous sleep study. Pain complaints include intermittent leg, back, and hand pain due to arthritis. There is no significant history of alcohol or illicit substance use. Tobacco use ceased in 2009. Family history of neurologic illness is significant for stroke in the mother. With regard to emotional functioning, Ms. Loni Jackson denied a history of suicidal ideation, psychiatric hospitalization, self-harm behaviors, and psychological trauma. She did, however report depressive symptomatology due to her poor health, particularly her vision difficulties. Socially, Ms. Loni Jackson has never been . She has several adult children. Academically, she completed 14 years of education and denied a history of LD. 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 Ms. Loni Jackson resides with her sister. She relocated from [place] 3 years ago. Functionally, Ms. Loni Jackson is dependent on her sister for medication management and bill payment. She has not driven in 4-5 years due to neuropathy.       MENTAL STATUS:    Sensorium  Awake, Aware, Alert   Orientation person, place and situation   Relations cooperative and passive   Eye Contact appropriate   Appearance:  casually dressed   Motor Behavior:  hypoactive and within normal limits   Speech:  soft and garbled   Vocabulary average   Thought Process: disorganized Thought Content free of delusions and free of hallucinations   Suicidal ideations none   Homicidal ideations none   Mood:  depressed   Affect:  mood-congruent   Memory recent  impaired   Memory remote:  impaired   Concentration:  adequate   Abstraction:  abstract   Insight:  fair   Reliability fair   Judgment:  fair     METHODS OF ASSESSMENT (Current Evaluation):  Clinician Administered:  Clock Drawing Task  Geriatric Anxiety Scale (GAS)  Geriatric Depression Scale: Short Form (GDS)  Mini Mental State Examination (MMSE)    Technician Administered: Adaptive Behavior Assessment System (ABAS-3)  Category Fluency for Animals  Clinical Assessment of Geriatric Emotional Status (CASE)  Controlled Oral Word Association Test  Neuropsychological Assessment Battery-Select Subtests  RBANS-A-select subtests  Reliable Digit Span  Test of Memory Malingering  [place] Functional Living Scale  Trailmaking Test  Wechsler Adult Intelligence Scale-IV  Word Choice Effort Test (ACS)    TEST OBSERVATIONS:  Ms. Elvia Gurrola was tested over the course of two testing dates. Dress and grooming were appropriate; physical presentation was unchanged from that observed during the clinical interview. Speech was fluent; response style was delayed. No expressive or receptive language deficits were noted. No unusual behaviors or psychomotor abnormalities were observed. Thought process was logical, relevant, and focused. Thought content showed no apparent delusional ideation. Auditory and visual hallucinations were denied and there was no obvious response to internal stimuli. Affect was congruent with the depressed and somnolent mood conveyed. Ms. Elvia Gurrola was adequately cooperative and appeared to put forth good effort throughout this examination. Rapport with the examiner was adequately established and maintained. Minimal prompting was required. Comprehension of test instructions was not problematic.   Performance motivation was objectively measured with three instruments (TOMM, Reliable Digit Span, ACS Word Choice); Ms. Minda Mcclellan produced normal scores on these measures. Accordingly, test findings below do not appear to be the product of disingenuous effort. Given the above observations, plus comments contained in the Mental Status section, the results of this examination are regarded as reasonably reliable and valid. TEST RESULTS:  Quantitative test results are derived from comparisons to age and education corrected cohort normative data, where applicable. Percentiles are included in these instances. Qualitative test results are determined using clinical observations. General Orientation and Awareness:       Orientation to person yes   Time no  Place no  Circumstance yes                     Sensory-Perceptual and Motor Functioning:    Visual and auditory acuity:  Difficulty reading/seeing stimuli      Gait and balance:   Ambulated with cane                 Cognitive Screening: On the MMSE, Ms. Minda Mcclellan showed difficulties with the following: Orientation (misidentified the year, month, date, day of the week, current city, and current floor), immediate spontaneous and cued recall, following a verbal prompt. Clock drawing was WNL.     Attention/Concentration:       Simple visuomotor tracking (2 percentile)                   Moderately Impaired  Digits forward (62 percentile)                     Average  Digits backward (10 percentile)               Low Average  Visual scanning (84 percentile)                          Above Average    Visuospatial and Constructional Praxis:     Visual discrimination (<1 percentile)                               Severely Impaired     Language:         Phonemic verbal fluency (73 percentile)                                  Average   Categorical verbal fluency (86 percentile)                   Above Average  Auditory comprehension (4 percentile)                     Moderately Impaired   Naming (16 percentile)             Low Average    Memory and Learning:       Immediate word list learning (6 percentile)                   Mildly Impaired  Delayed word list recall (34 percentile)                    Average  Delayed word list recognition (3 percentile)                   Moderately Impaired  Immediate story memory (1 percentile)                      Severely Impaired  Delayed story recall (1 percentile)                    Severely Impaired  Shape learning immediate recognition (4 percentile)        Moderately Impaired    Shape learning delayed recognition (21 percentile)                  Low Average  Forced Choice Recognition (5 percentile)         Mildly Impaired    Cognitive Tests of Executive Functioning:     Ability to think flexibly, Trailmaking B (24 percentile)                  Low Average    Intellectual and Basic Cognitive Functioning (WAIS-IV)  Verbal Comp Index: 72  Percentile: 3       Borderline   Similarities: 5   Percentile: 5      Vocabulary: 6    Percentile: 9           Information: 4   Percentile: 2       Adaptive Behavior Measure for Independent Living [facility] Functional Living Scale):  Time                 51-75 percentile       Average  Money and calculation   17-25 percentile       Low Average  Communication     3-9 percentile       Mildly Impaired  Memory      17-25 percentile       Low Average  Total                 5 percentile        Mildly Impaired    Adaptive Behavior (Adaptive Behavior Assessment System)  General Adaptive Composite:  72   Percentile: 3  Moderately Impaired   Conceptual:  70    Percentile: 2  Moderately Impaired   Social:  78    Percentile: 7  Mildly Impaired   Practical:  72    Percentile: 3  Moderately Impaired    Emotional Functioning:  Screening of Emotional/Psychiatric Status:  Level of self-reported depression   (4/15)       Normal        Level of self-report anxiety   Total:    97 percentile        Severe   Somatic:   81 percentile        Mild   Cognitive:   98 percentile Severe   Affective:   99 percentile        Severe    On a measure of general emotional functioning completed by Ms. Carolyn Enamorado' sister, she reported observing the following: Anxiety, cognitive deficits, depressed mood, agitation/irritability, repetitive thoughts/behaviors, eccentric thought, somatic symptoms, and aberrant behaviors. IMPRESSIONS/RECOMMENDATIONS:  Although Ms. Carolyn Enamorado demonstrated difficulties with processing speed, visuospatial tasks, auditory comprehension, and select areas of memory, she did not evidence significant functional impairment on performance-based measures of adaptive behavior. Furthermore, she displayed somnolence and tremendous visual deficits, both of which likely interfered with her performance. That said, her EEG findings were of concern. Taken together, I do not believe there is sufficient evidence for dementia, though there does appear to be a mild neurocognitive disorder that could represent an emerging dementia. For this reason, serial testing in 12 months is strongly encouraged. Ms. Carolyn Enamorado might also benefit from a consult with neurology given the history of seizure. In the interim, Ms. Carolyn Enamorado is advised to adhere to her diabetic regimen and to adopt and adhere to a brain fitness regimen (sleep hygiene, physician-approved level of exercise, healthy diet, mentally stimulating activities, minimal alcohol consumption, avoid nicotine). She is also encouraged to consider the general recommendations below. Along with cognitive symptoms, Ms. Carolyn Enamorado appears to be experiencing persistent anxiety and depressive symptomatology. For this reason, pharmacotherapy and mental health counseling are suggested. Should Ms. Carolyn Enamorado be interested in mental health services, a [employer] will be provided at the time of feedback. DIAGNOSTIC IMPRESSIONS:  1. Mild Neurocognitive Disorder  2. Anxiety Disorder, unspecified  3.  Symptoms of Depression    GENERAL RECOMMENDATIONS:   Exercise: Exercise can improve your thinking and ability to focus. Activities such as gardening, caring for pets, or walking, can help improve your attention and concentration levels. Meditation: Meditation can help improve brain function by increasing your focus and awareness. Day-to-day coping   Use a detailed daily planner, notebooks, reminder notes, or your smart phone. [place] Keeping everything in one place makes it easier to find the reminders you may need. You might want to keep track of appointments and schedules, to do lists, important dates, websites, phone numbers and addresses, meeting notes, and even movies youd like to see or books youd like to read.  Do the most demanding tasks at the time of they day when you feel your energy levels are the highest.   Exercise your brain. Take a class, do word puzzles, or learn a new language.  Get enough rest and sleep.  Keep moving. Regular physical activity is not only good for your body, but also improves your mood, makes you feel more alert, and decreases tiredness (fatigue).  Eat veggies. Studies have shown that eating more vegetables is linked to keeping brain power as people age.  Set up and follow routines. Try to keep the same daily schedule.  Pick a certain place for commonly lost objects (like keys) and put them there each time.  Try not to multi-task. Focus on one thing at a time.  Avoid alcohol and other agents that might change your mental state and sleeping patterns   Ask for help when you need it. Friends and loved ones can help with daily tasks to cut down on distractions and help you save mental energy.  Track your memory problems. Keep a diary of when you notice problems and whats going on at the time. Medicines taken, time of day, and the situation youre in might help you figure out what affects your memory. Keeping track of when the problems are most noticeable can also help you prepare.  Leonel Kenny know to avoid planning important conversations or appointments during those times. This record will also be useful when you talk with your doctor about these problems.  Try not to focus on how much these symptoms bother you. Accepting the problem will help you deal with it. As many patients have noted, being able to laugh about things you cant control can help you cope. And remember, you probably notice your problems much more than others do. Thank you for allowing me the opportunity to assist you in Ms. Bonner's care. Please do not hesitate to contact me should you have additional questions that I may not have addressed. 12628 x 1  96137 x 1  96138 x 1  96139 x 4  96132 x 1  96133 x 1    [facility] Sathya Ramirez, Ph.D.   Licensed Clinical Psychologist        Time Documentation:     99751 x 1   65267 x 1 Neuropsych testing/data gathering by Neuropsychologist: (1 hour). 52181 x1 (first 30 minutes), 96137 x 1 (additional 30 minutes)     96138 x 1  96139 x 4 Test Administration/Data Gathering By Technician: (2.5 hours). 84085 x 1 (first 30 minutes), 96139 x 4 (each additional 30 minutes)     96132 x 1  96133 x 1 Testing Evaluation Services by Neuropsychologist (1 hour, 50 minutes), 02079 x1 (first hour), 88324 x1 (additional 50 minutes)     The above includes: Record review. Review of history provided by patient. Review of collaborative information. Testing by Clinician. Review of raw data. Scoring. Report writing of individual tests administered by Clinician. Integration of individual tests administered by psychometrist with NSE/testing by clinician, review of records/history/collaborative information, case Conceptualization, treatment planning, clinical decision making, report writing, coordination Of Care.  Psychometry test codes as time spent by psychometrist administering and scoring neurocognitive/psychological tests under supervision of neuropsychologist.  Integral services including scoring of raw data, data interpretation, case conceptualization, report writing etcetera were initiated after the patient finished testing/raw data collected and was completed on the date the report was signed. This note will not be viewable in [address] This note was created using voice recognition software. Despite editing, there may be syntax errors. I have reviewed the documentation provided by Dr. Nino Dempsey, PhD, Wendie Wells. Dr. Maria Guadalupe Kuhn is in her second year of Fellowship in Clinical Neuropsychology. Dr. Maria Guadalupe Kuhn is licensed and credentialed to practice in the [address] [place] [address], is providing the current services via her NPI and licensure, and had been providing similar services prior to her employment with [employer]. No additional insurance billing is done by me on her cases, my NPI is not being used, etc.   I have not had any face to face engagement with her patients, and am providing supervision and consultation services with her as she works towards advancing her career and subspecialities. I have reviewed the history, the neurocognitive and psychological test results, the medical records available, and the impressions and recommendations generated by Dr. Maria Guadalupe Kuhn. We have engaged in peer to peer supervision as needed. I have reviewed history noted in the records, the tests administered, the test scores, and the overall case history and profile and report generated by Dr. Maria Guadalupe Kuhn. Dr. Flora Bronson clinical case formulation, diagnostic impressions, and the proposed management plans/treatment recommendations are her own and based on her clinical training, level of expertise, and judgment. Any additional comments, concerns, or recommendations that I am making are offered here: This is a strange pattern of scores and am deferring to Dr. Flora Bronson opinion here as to the impact somnolence had on which particular tests. Concur with need for serial testing to better clarify. Jaqueline Arrington, LCP  Neuropsychology

## 2021-06-20 ENCOUNTER — HOME CARE VISIT (OUTPATIENT)
Dept: HOME HEALTH SERVICES | Facility: HOME HEALTH | Age: 71
End: 2021-06-20
Payer: MEDICARE

## 2021-06-21 ENCOUNTER — DOCUMENTATION ONLY (OUTPATIENT)
Dept: FAMILY MEDICINE CLINIC | Facility: CLINIC | Age: 71
End: 2021-06-21

## 2021-06-23 ENCOUNTER — HOME CARE VISIT (OUTPATIENT)
Dept: HOME HEALTH SERVICES | Facility: HOME HEALTH | Age: 71
End: 2021-06-23
Payer: MEDICARE

## 2021-06-23 ENCOUNTER — HOME CARE VISIT (OUTPATIENT)
Dept: SCHEDULING | Facility: HOME HEALTH | Age: 71
End: 2021-06-23
Payer: MEDICARE

## 2021-06-23 PROCEDURE — 400013 HH SOC

## 2021-06-23 PROCEDURE — 400018 HH-NO PAY CLAIM PROCEDURE

## 2021-06-23 PROCEDURE — G0299 HHS/HOSPICE OF RN EA 15 MIN: HCPCS

## 2021-06-23 PROCEDURE — 3331090002 HH PPS REVENUE DEBIT

## 2021-06-23 PROCEDURE — 3331090001 HH PPS REVENUE CREDIT

## 2021-06-24 ENCOUNTER — HOME CARE VISIT (OUTPATIENT)
Dept: HOME HEALTH SERVICES | Facility: HOME HEALTH | Age: 71
End: 2021-06-24
Payer: MEDICARE

## 2021-06-24 VITALS
OXYGEN SATURATION: 98 % | TEMPERATURE: 96 F | HEART RATE: 68 BPM | RESPIRATION RATE: 18 BRPM | SYSTOLIC BLOOD PRESSURE: 136 MMHG | DIASTOLIC BLOOD PRESSURE: 68 MMHG

## 2021-06-24 PROCEDURE — 3331090001 HH PPS REVENUE CREDIT

## 2021-06-24 PROCEDURE — 3331090002 HH PPS REVENUE DEBIT

## 2021-06-25 ENCOUNTER — HOME CARE VISIT (OUTPATIENT)
Dept: SCHEDULING | Facility: HOME HEALTH | Age: 71
End: 2021-06-25
Payer: MEDICARE

## 2021-06-25 VITALS
RESPIRATION RATE: 19 BRPM | HEART RATE: 81 BPM | OXYGEN SATURATION: 97 % | TEMPERATURE: 97.2 F | DIASTOLIC BLOOD PRESSURE: 62 MMHG | SYSTOLIC BLOOD PRESSURE: 140 MMHG

## 2021-06-25 PROCEDURE — G0299 HHS/HOSPICE OF RN EA 15 MIN: HCPCS

## 2021-06-25 PROCEDURE — 3331090002 HH PPS REVENUE DEBIT

## 2021-06-25 PROCEDURE — 3331090001 HH PPS REVENUE CREDIT

## 2021-06-26 PROCEDURE — 3331090002 HH PPS REVENUE DEBIT

## 2021-06-26 PROCEDURE — 3331090001 HH PPS REVENUE CREDIT

## 2021-06-27 PROCEDURE — 3331090001 HH PPS REVENUE CREDIT

## 2021-06-27 PROCEDURE — 3331090002 HH PPS REVENUE DEBIT

## 2021-06-28 ENCOUNTER — DOCUMENTATION ONLY (OUTPATIENT)
Dept: FAMILY MEDICINE CLINIC | Facility: CLINIC | Age: 71
End: 2021-06-28

## 2021-06-28 PROCEDURE — 3331090001 HH PPS REVENUE CREDIT

## 2021-06-28 PROCEDURE — 3331090002 HH PPS REVENUE DEBIT

## 2021-06-28 NOTE — PROGRESS NOTES
Ms Wynn Carolyn reports patient has not come home yet. She will be home tomorrow, then has an appt on Wed. Will outreach again on Thursday 7/1.

## 2021-06-29 PROCEDURE — 3331090002 HH PPS REVENUE DEBIT

## 2021-06-29 PROCEDURE — 3331090001 HH PPS REVENUE CREDIT

## 2021-06-30 ENCOUNTER — HOME CARE VISIT (OUTPATIENT)
Dept: HOME HEALTH SERVICES | Facility: HOME HEALTH | Age: 71
End: 2021-06-30
Payer: MEDICARE

## 2021-06-30 ENCOUNTER — OFFICE VISIT (OUTPATIENT)
Dept: INTERNAL MEDICINE CLINIC | Age: 71
End: 2021-06-30

## 2021-06-30 DIAGNOSIS — E78.5 HYPERLIPIDEMIA ASSOCIATED WITH TYPE 2 DIABETES MELLITUS (HCC): ICD-10-CM

## 2021-06-30 DIAGNOSIS — E11.69 HYPERLIPIDEMIA ASSOCIATED WITH TYPE 2 DIABETES MELLITUS (HCC): ICD-10-CM

## 2021-06-30 DIAGNOSIS — E11.42 TYPE 2 DIABETES MELLITUS WITH PERIPHERAL NEUROPATHY (HCC): Primary | ICD-10-CM

## 2021-06-30 PROCEDURE — 3331090001 HH PPS REVENUE CREDIT

## 2021-06-30 PROCEDURE — 3331090002 HH PPS REVENUE DEBIT

## 2021-06-30 RX ORDER — ROSUVASTATIN CALCIUM 20 MG/1
TABLET, COATED ORAL
Qty: 90 TABLET | Refills: 0 | Status: SHIPPED | OUTPATIENT
Start: 2021-06-30 | End: 2021-09-18

## 2021-06-30 NOTE — PATIENT INSTRUCTIONS
Your Visit Summary:     - Continue current medications     Check and document your blood sugar first thing in the morning (fasting), before a meal, and before bedtime. Bring your meter/log to all future visits. Your blood sugar goals:  - Fasting (first thing in the morning)  blood sugar: 80 - 130   - 1 to 2 hours after a meal: less than 180     When you experience symptoms of low blood sugar (example: less than 70):  - Confirm low reading by checking your blood sugar.   - Then treat with 15 grams of carbohydrates (one-half cup of juice or regular soda, or 4-5 glucose tablets). - Wait 15 minutes to recheck blood sugar.   - Then eat a protein containing meal/snack to prevent another low blood sugar episode. (example: peanut butter + crackers)    Other recommendations:  - Schedule an annual eye exam.  - Check your feet daily for any signs of open wounds, cuts, or sores. - Given your risk factors, the following vaccines are recommended: annual flu shot, age-based pneumococcal vaccines (Pneumovax, Prevnar 13). In addition to taking your medications as directed, improving your blood sugar involves modifying your nutrition and maximizing the amount of physical activity. Nutrition:  - When reviewing a nutrition label, focus on the serving size, total calories, fat (and type of fats), total carbohydrates, sugar (and amount of added sugar), amount of fiber (good for your digestive), and amount of protein. Refer to your nutrition label guide for more information.  - For a meal : max 45 - 60 grams of carbohydrates  - For a snack: max 15 grams of carbohydrates  - Reduce amount of saturated and trans fat. Consider more unsaturated fat options as they are better for your heart health.    - Have at least 1 serving of lean fat protein with each meal.    - Increase fiber intake slowly to prevent constipation.   - Substitute fruit juices for the whole fruit    Low carb snack ideas (15 grams total carb or less):     String cheese or babybel with 6 crackers   4 peanut butter crackers   3 cups of popcorn   1 cup raw vegetables with hummus or ranch dip (just need to watch how much dip you use)   Nuts   2 rice cakes   Celery with peanut butter or cream cheese   String cheese with 1 serving of fruit   Greek yogurt (look at label to make sure < 15 gram carb)   Plain greek yogurt with fresh berries added   Nature valley protein bar   Whisps parmesan cheese crisps   Hard boiled egg   Cottage cheese   Tuna salad lettuce roll-ups   Deli meat roll-ups with slice of cheese   Sugar Free Jello   Glucerna shake (16 grams)    Glucerna hunger smart shake (16 grams)   Ensure protein max shake   Fruit (1 serving/15 grams)   1/2 banana, laverne, or grapefruit    1/3 melon (small cantaloupe)   1 slice or 1 cup of honeydew melon   1 slice or 1 and 1/4 cups of watermelon    1 small apple, peach, orange or pear   2 small tangerines   1 cup of raspberries   3/4 cup of blackberries, blueberries or pineapples   1/2 cup of fruit juice, pears, applesauce, or mangos   17 small grapes   12 cherries    Be careful with the glucerna products as they differ in the total carbs depending on the product (some are intended as meal replacements not snacks). Make sure you look at the total carbs on the label as products can differ. Physical Activity:  - Aim for 30 minutes of consistent, moderately intensive, physical activity a day for 5 days or an average of 150 minutes per week. - Start slow, increase as tolerated. For example: Walk every day, working up to 30 minutes of brisk walking, 5 days a weekor split the 30 minutes into two 15-minute or three 10-minute walks. - If you sit for a long time, get up and move/stretch every 90 minutes.

## 2021-07-01 DIAGNOSIS — E27.1 ADDISON'S DISEASE (HCC): ICD-10-CM

## 2021-07-01 PROCEDURE — 3331090002 HH PPS REVENUE DEBIT

## 2021-07-01 PROCEDURE — 3331090001 HH PPS REVENUE CREDIT

## 2021-07-01 RX ORDER — HYDROCORTISONE 10 MG/1
TABLET ORAL
Qty: 180 TABLET | Refills: 0 | Status: SHIPPED | OUTPATIENT
Start: 2021-07-01 | End: 2021-09-30

## 2021-07-02 ENCOUNTER — DOCUMENTATION ONLY (OUTPATIENT)
Dept: FAMILY MEDICINE CLINIC | Facility: CLINIC | Age: 71
End: 2021-07-02

## 2021-07-02 PROCEDURE — 3331090001 HH PPS REVENUE CREDIT

## 2021-07-02 PROCEDURE — 3331090002 HH PPS REVENUE DEBIT

## 2021-07-02 NOTE — LETTER
7/2/2021 12:04 PM    Ms. Andreas Black  [address]      Dear Andreas Black,      It was a pleasure talking with you about Advance Care Planning today. As we discussed, I am mailing you some information about Advance Care Planning. If you change your mind and would like to complete an Advance Medical Directive you may call me or let your primary care provider know. You may reach me directly at [phone].         Sincerely,      Dusty Will RN

## 2021-07-02 NOTE — ACP (ADVANCE CARE PLANNING)
Advance Care Planning   Ambulatory ACP Specialist Patient Outreach    Date:  7/2/2021    ACP Specialist:  Lynette Garrett RN    Outreach call to patient in follow-up to ACP Specialist referral from:    [x] PCP  [] Provider   [] Ambulatory Care Management [] Other     For:                  [] Continued Conversation for ACP decision making / Goals of Care             [] Code Status Discussion             [x] Completion of Adv Directive             [] Completion of Portable DNR order             [] Other (Specify)    Date Referral Received: 6/4/21    Today's Outreach:  [] First   [] Second  [] Third  [x] Fourth                                           Third outreach made by []  phone  [] email []   [employer]     Intervention:  [x] Spoke with Patient   [] Left VM requesting return call      Outcome: Patient is not interested in ACP at this time. Patient consented to receiving mail containing ACP info. Encouraged patient to call me or let her PCP know if she changes her mind and would like to move forward with ACP. Next Step:   [] ACP scheduled conversation  [] Outreach again in one week               [x] Email / Mail ACP Info Sheets  [] Email / Mail Advance Directive   [x]  Closing referral.  Routing closure to referring provider/staff and to ACP Specialist .      Thank you for this referral.

## 2021-07-02 NOTE — PROGRESS NOTES
Pharmacy Progress Note - Diabetes Management    S/O: Ms. Campbell is a 79 y.o. female, referred by Dr. Suhail Garg NP, was seen today for diabetes management visit. Patient's last A1c was 14.1% in April 2021. Patient was seen today with sister, Mrs. Malik Blount, who helps to manage her care. Interim History: PharmD spoke with patient and sister a few months ago for diabetes management. At that time, it was decided that patient would benefit most from endocrinology consult to see if she would be eligible for an insulin pump. She has been unable to make an appointment due to insurance network issues, so she has still struggled with blood sugar control. She was recently admitted to the hospital again in early June for hyperglycemia. She states that she is doing better now, but still struggles with checking her blood sugar at home. Her sister also acknowledges that she does not get in all of her insulin doses throughout the day, and does not always follow a diabetic friendly diet. Patient presents today to reestablish care with PharmD until she is able to get in to see endocrinology. Current anti-hyperglycemic regimen include(s):    - Levemir 16 units 1-2x daily  - Humalog 12 units three times daily before meals     Patient denies adherence with her medications. Her sister acknowledges that patient does not get all of her insulin doses in every day.      ROS:  Today, Pt endorses:  - Symptoms of Hyperglycemia: fatigue and weakness  - Symptoms of Hypoglycemia: none    Self Monitoring Blood Glucose (SMBG) or CGM:  - Brought in home glucometer/blood glucose log/CGM reader today:  no  - Conducts/scans: Patient is checking blood sugars 4 times daily   - She states that blood sugars vary widely, but was unable to give exact numbers in clinic today      Nutrition/Lifestyle Modifications:  - Not discussed in detail this visit, but sister acknowledges that patient is not following a diabetic friendly diet majority of the time     Vitals: Wt Readings from Last 3 Encounters:   06/17/21 155 lb 9.6 oz (70.6 kg)   06/03/21 151 lb (68.5 kg)   05/06/21 151 lb (68.5 kg)     BP Readings from Last 3 Encounters:   06/25/21 (!) 140/62   06/23/21 136/68   06/17/21 (!) 155/76     Pulse Readings from Last 3 Encounters:   06/25/21 81   06/23/21 68   06/17/21 80       Past Medical History:   Diagnosis Date    Arthritis     Autoimmune disease ([facility] [address]     RA    Congestive heart failure (HCC)     Neuropathy     Seizures (HCC)      Allergies   Allergen Reactions    Codeine Other (comments)     Patient does not like the reaction towards her body. Current Outpatient Medications   Medication Sig    hydrocortisone (CORTEF) 10 mg tablet TAKE 1 TABLET BY MOUTH TWICE A DAY    rosuvastatin (CRESTOR) 20 mg tablet TAKE 1 TABLET BY MOUTH EVERY DAY AT NIGHT    amLODIPine (NORVASC) 10 mg tablet Take 1 Tablet by mouth daily. Indications: high blood pressure    furosemide (LASIX) 40 mg tablet Take 1 Tablet by mouth daily. Indications: accumulation of fluid resulting from chronic heart failure    insulin detemir U-100 (Levemir FlexTouch U-100 Insuln) 100 unit/mL (3 mL) inpn 16 Units by SubCUTAneous route Daily (before dinner). Indications: type 2 diabetes mellitus    losartan (COZAAR) 100 mg tablet Take 1 Tab by mouth daily. Indications: high blood pressure    gabapentin (NEURONTIN) 300 mg capsule Take 1 Cap by mouth three (3) times daily. Max Daily Amount: 900 mg.    methotrexate (RHEUMATREX) 2.5 mg tablet Take 15 mg by mouth every Monday.  folic acid (FOLVITE) 1 mg tablet Take 1 mg by mouth daily.  aspirin delayed-release 81 mg tablet Take 81 mg by mouth daily.  glucose blood VI test strips (ASCENSIA AUTODISC VI, ONE TOUCH ULTRA TEST VI) strip Freestyle Lite Strips; ICD 10 Code: E 11.9 Type 2 Diabetes; On Insulin: Yes Z279.4 Long Term (current) Insulin Use.     Blood-Glucose Meter misc Freestyle Freedom Lite Blood Glucose Meter; E 11. 9 Type 2 Diabetes; On Insulin: Yes Z279.4 Long Term (current) Insulin Use.  albuterol (PROVENTIL HFA, VENTOLIN HFA, PROAIR HFA) 90 mcg/actuation inhaler Take 2 Puffs by inhalation every six (6) hours as needed.  insulin aspart U-100 (NOVOLOG) 100 unit/mL injection 12 Units by SubCUTAneous route Before breakfast, lunch, and dinner.  lancets misc Trueplus Lancets; ICD 10 Code: E 11.9 Type 2 Diabetes; On Insulin: Yes Z279.4 Long Term (current) Insulin Use. No current facility-administered medications for this visit. Lab Results   Component Value Date/Time    Sodium 139 04/06/2021 03:40 PM    Potassium 4.1 04/06/2021 03:40 PM    Chloride 104 04/06/2021 03:40 PM    CO2 28 04/06/2021 03:40 PM    Anion gap 7.5 04/06/2021 03:40 PM    Glucose 151 (H) 04/06/2021 03:40 PM    BUN 17 04/06/2021 03:40 PM    Creatinine 1.0 04/06/2021 03:40 PM    Calcium 9.1 04/06/2021 03:40 PM       Lab Results   Component Value Date/Time    Cholesterol, total 183 04/06/2021 03:40 PM    HDL Cholesterol 49 04/06/2021 03:40 PM    LDL, calculated 110 (H) 04/06/2021 03:40 PM    VLDL, calculated 25 04/06/2021 03:40 PM    Triglyceride 125 04/06/2021 03:40 PM       Lab Results   Component Value Date/Time    WBC 9.1 04/06/2021 03:40 PM    HGB 10.5 (L) 04/06/2021 03:40 PM    HCT 33.5 (L) 04/06/2021 03:40 PM    PLATELET 923 87/16/9038 03:40 PM    MCV 84 04/06/2021 03:40 PM       No results found for: MCACR, MCA1, MCA2, MCA3, MCAU, MCAU2, MCALPOCT    HbA1c:  Lab Results   Component Value Date/Time    Hemoglobin A1c 14.1 (H) 04/06/2021 03:40 PM    Hemoglobin A1c (POC) 14.6 02/02/2021 12:00 AM     No components found for: 2     Last Point of Care HGB A1C  Hemoglobin A1c (POC)   Date Value Ref Range Status   02/02/2021 14.6 % Final        CrCl cannot be calculated (Unknown ideal weight. ). A/P:    Diabetes Management:  - Per ADA guidelines, Pt's A1c is not at goal of < 7%.    - Current SMBG(s)/CGM trend is unable to be assessed today since patient did not have meter or log with her   - Provided patient with Freestyle Aggie CGM in clinic today - patient states that she has used it in the past and is familiar with how to use it  - Will see if we can get this covered for her to use moving forward so that we can better manage her insulin doses  - Long term, she does want to be transitioned to an insulin pump as injection fatigue is likely playing a role in poor blood sugar control  - Continue current does of insulin for now; reviewed blood sugar goals with her  - Instructed her to call PCP/PharmD if blood sugars spike high into the 300s  - Otherwise, will see patient back in office in 1 month to reassess control and make adjustments from there (could not get patient in sooner)    Nutrition/Lifestyle Modifications:  - Reviewed with patient utilization of the plate method as a way to encourage healthy eating. Reviewed carbohydrate and non-carbohydrate rich foods, carbohydrate content of various foods, appropriate serving sizes for carbohydrates, and discussion regarding fruits as a carbohydrate. - Encouraged patient to use CGM device to better understand how her diet is impacting her blood sugars    Medication reconciliation was completed during the visit. There are no discontinued medications. Patient verbalized understanding of the information presented and all of the patients questions were answered. AVS was handed to the patient. Patient advised to call the office with any additional questions or concerns. Notifications of recommendations will be sent to Dr. Star Kenny, LEAH for review. Patient will return to clinic in 4 week(s) for follow up. Thank you,  Wayne Sharma. SHERRY Lundy      For Pharmacy Admin Tracking Only     CPA in place:  Yes   Recommendation Provided To: Patient/Caregiver: 1 via In person   Intervention Detail: Device Training   Total # of Interventions Recommended: 1   Total # of Interventions Accepted: 1   Intervention Accepted By: Patient/Caregiver: 1   Time Spent (min): 45

## 2021-07-03 ENCOUNTER — HOME CARE VISIT (OUTPATIENT)
Dept: HOME HEALTH SERVICES | Facility: HOME HEALTH | Age: 71
End: 2021-07-03
Payer: MEDICARE

## 2021-07-03 PROCEDURE — 3331090001 HH PPS REVENUE CREDIT

## 2021-07-03 PROCEDURE — 3331090002 HH PPS REVENUE DEBIT

## 2021-07-04 PROCEDURE — 3331090002 HH PPS REVENUE DEBIT

## 2021-07-04 PROCEDURE — 3331090001 HH PPS REVENUE CREDIT

## 2021-07-05 PROCEDURE — 3331090001 HH PPS REVENUE CREDIT

## 2021-07-05 PROCEDURE — 3331090002 HH PPS REVENUE DEBIT

## 2021-07-06 PROCEDURE — 3331090001 HH PPS REVENUE CREDIT

## 2021-07-06 PROCEDURE — 3331090002 HH PPS REVENUE DEBIT

## 2021-07-07 PROCEDURE — 3331090001 HH PPS REVENUE CREDIT

## 2021-07-07 PROCEDURE — 3331090002 HH PPS REVENUE DEBIT

## 2021-07-08 PROCEDURE — 3331090001 HH PPS REVENUE CREDIT

## 2021-07-08 PROCEDURE — 3331090002 HH PPS REVENUE DEBIT

## 2021-07-09 ENCOUNTER — HOME CARE VISIT (OUTPATIENT)
Dept: HOME HEALTH SERVICES | Facility: HOME HEALTH | Age: 71
End: 2021-07-09
Payer: MEDICARE

## 2021-07-09 ENCOUNTER — HOME CARE VISIT (OUTPATIENT)
Dept: SCHEDULING | Facility: HOME HEALTH | Age: 71
End: 2021-07-09
Payer: MEDICARE

## 2021-07-09 PROCEDURE — G0299 HHS/HOSPICE OF RN EA 15 MIN: HCPCS

## 2021-07-09 PROCEDURE — 3331090001 HH PPS REVENUE CREDIT

## 2021-07-09 PROCEDURE — 3331090002 HH PPS REVENUE DEBIT

## 2021-07-09 PROCEDURE — 3331090003 HH PPS REVENUE ADJ

## 2021-07-11 VITALS
DIASTOLIC BLOOD PRESSURE: 78 MMHG | RESPIRATION RATE: 18 BRPM | HEART RATE: 84 BPM | SYSTOLIC BLOOD PRESSURE: 140 MMHG | OXYGEN SATURATION: 98 % | TEMPERATURE: 97 F

## 2021-07-22 ENCOUNTER — OFFICE VISIT (OUTPATIENT)
Dept: NEUROLOGY | Age: 71
End: 2021-07-22
Payer: MEDICARE

## 2021-07-22 DIAGNOSIS — G31.84 MILD NEUROCOGNITIVE DISORDER: Primary | ICD-10-CM

## 2021-07-22 DIAGNOSIS — R45.89 SYMPTOMS OF DEPRESSION: ICD-10-CM

## 2021-07-22 DIAGNOSIS — F41.9 ANXIETY DISORDER, UNSPECIFIED TYPE: ICD-10-CM

## 2021-07-22 PROCEDURE — 90832 PSYTX W PT 30 MINUTES: CPT | Performed by: PSYCHOLOGIST

## 2021-07-22 NOTE — PROGRESS NOTES
Interactive Psychotherapy/office feedback        Interactive office feedback session with Ms. Paresh Rice and her sister. I reviewed the results of the recent Neuropsychological Evaluation  including the observed areas of neurocognitive strengths and weaknesses. Education was provided regarding my diagnostic impressions, and treatment plan/options were discussed. I also answered numerous questions related to the clinical findings, including the various methods to improve cognition and mood. CBT, psychoeducation, and supportive psychotherapy techniques were utilized. Prior to seeing the patient I reviewed the records, including the previously completed report, the records in [place], and any updated visits from other providers since I saw the patient last.       Diagnoses:      1. Mild Neurocognitive Disorder  2. Anxiety Disorder, unspecified  3. Symptoms of Depression    The patient will follow up with the referring provider, and reported being very pleased with the services provided. Follow up with [facility] 12 months. [id number] psychotherapy 30 Minutes      This note was created using voice recognition software. Despite editing, there may be syntax errors.

## 2021-07-29 DIAGNOSIS — I13.0 HYPERTENSIVE HEART AND CHRONIC KIDNEY DISEASE WITH HEART FAILURE AND STAGE 1 THROUGH STAGE 4 CHRONIC KIDNEY DISEASE, OR CHRONIC KIDNEY DISEASE (HCC): ICD-10-CM

## 2021-07-29 RX ORDER — LOSARTAN POTASSIUM 100 MG/1
100 TABLET ORAL DAILY
Qty: 90 TABLET | Refills: 0 | Status: SHIPPED | OUTPATIENT
Start: 2021-07-29 | End: 2021-11-24 | Stop reason: SDUPTHER

## 2021-08-03 ENCOUNTER — TELEPHONE (OUTPATIENT)
Dept: FAMILY MEDICINE CLINIC | Age: 71
End: 2021-08-03

## 2021-08-03 NOTE — TELEPHONE ENCOUNTER
Please call patient and reschedule her missed appointment. Have her complete fasting labs 1 week prior to rescheduling. Thank you.

## 2021-08-12 ENCOUNTER — TELEPHONE (OUTPATIENT)
Dept: INTERNAL MEDICINE CLINIC | Age: 71
End: 2021-08-12

## 2021-08-12 NOTE — TELEPHONE ENCOUNTER
Pharmacy Progress Note - Telephone Call    Ms. Rm Gu 79 y.o. was contacted via an outbound telephone call regarding diabetes management today. Attempted to contact Ms. Amber Johnson, who assists patient with her care. A voicemail was left for patient to return my call. Thank you,  Reji Betancourt. SHERRY Connolly    For Pharmacy Admin Tracking Only     CPA in place:  Yes   Time Spent (min): 5

## 2021-08-15 PROBLEM — Z87.891 PERSONAL HISTORY OF TOBACCO USE, PRESENTING HAZARDS TO HEALTH: Status: ACTIVE | Noted: 2021-08-15

## 2021-08-15 NOTE — ASSESSMENT & PLAN NOTE
Advised again to call insurance regarding in-  Understands that she will need specialist to manage her medication

## 2021-08-15 NOTE — ASSESSMENT & PLAN NOTE
Advised to complete repeat labs, continue regimen for now  Appreciate pharmD assistance with regimen  Connecting with endo has been a challenge d/t insurance  Emphasized again importance of reaching out to her insurance for [facility] endocrinologist

## 2021-08-15 NOTE — ASSESSMENT & PLAN NOTE
Advised once again to call insurance for in-network endo providers  Understands that specialist will need to manage her regimen

## 2021-08-16 ENCOUNTER — OFFICE VISIT (OUTPATIENT)
Dept: FAMILY MEDICINE CLINIC | Age: 71
End: 2021-08-16

## 2021-08-16 ENCOUNTER — TELEPHONE (OUTPATIENT)
Dept: FAMILY MEDICINE CLINIC | Age: 71
End: 2021-08-16

## 2021-08-16 NOTE — TELEPHONE ENCOUNTER
Please call patient's sister to reschedule missed appointment. Go ahead and reschedule her missed appointment with Piper Barraza as well. Please remind patient to have her labs done 1 week prior to follow-up. Thank you.

## 2021-09-18 DIAGNOSIS — E78.5 HYPERLIPIDEMIA ASSOCIATED WITH TYPE 2 DIABETES MELLITUS (HCC): ICD-10-CM

## 2021-09-18 DIAGNOSIS — E11.69 HYPERLIPIDEMIA ASSOCIATED WITH TYPE 2 DIABETES MELLITUS (HCC): ICD-10-CM

## 2021-09-18 RX ORDER — ROSUVASTATIN CALCIUM 20 MG/1
TABLET, COATED ORAL
Qty: 30 TABLET | Refills: 0 | Status: SHIPPED | OUTPATIENT
Start: 2021-09-18 | End: 2022-01-20 | Stop reason: SDUPTHER

## 2021-09-19 NOTE — TELEPHONE ENCOUNTER
30-day refill given. She is overdue for repeat fasting labs and has missed a couple of appointments. Please have patient complete labs and schedule an appointment 1 week after. Thank you.

## 2021-09-30 DIAGNOSIS — E27.1 ADDISON'S DISEASE (HCC): ICD-10-CM

## 2021-09-30 RX ORDER — HYDROCORTISONE 10 MG/1
TABLET ORAL
Qty: 180 TABLET | Refills: 0 | Status: SHIPPED | OUTPATIENT
Start: 2021-09-30 | End: 2022-01-03

## 2021-09-30 NOTE — TELEPHONE ENCOUNTER
Refill approved, however, patient is due for labs and follow-up. Please have patient complete fasting labs and schedule an appointment 1 week after. Thank you.

## 2021-10-04 NOTE — TELEPHONE ENCOUNTER
Patient sister Lia Garcia stated that patient was asleep so I explained that I will call back tomorrow.

## 2021-11-01 ENCOUNTER — OFFICE VISIT (OUTPATIENT)
Dept: FAMILY MEDICINE CLINIC | Age: 71
End: 2021-11-01
Payer: MEDICARE

## 2021-11-01 VITALS
RESPIRATION RATE: 18 BRPM | DIASTOLIC BLOOD PRESSURE: 94 MMHG | HEIGHT: 66 IN | HEART RATE: 87 BPM | TEMPERATURE: 97.5 F | WEIGHT: 149 LBS | SYSTOLIC BLOOD PRESSURE: 193 MMHG | BODY MASS INDEX: 23.95 KG/M2 | OXYGEN SATURATION: 99 %

## 2021-11-01 DIAGNOSIS — R41.3 MEMORY LOSS: ICD-10-CM

## 2021-11-01 DIAGNOSIS — Z79.4 TYPE 2 DIABETES MELLITUS WITH HYPERGLYCEMIA, WITH LONG-TERM CURRENT USE OF INSULIN (HCC): ICD-10-CM

## 2021-11-01 DIAGNOSIS — E11.65 TYPE 2 DIABETES MELLITUS WITH HYPERGLYCEMIA, WITH LONG-TERM CURRENT USE OF INSULIN (HCC): ICD-10-CM

## 2021-11-01 DIAGNOSIS — I13.0 HYPERTENSIVE HEART AND CHRONIC KIDNEY DISEASE WITH HEART FAILURE AND STAGE 1 THROUGH STAGE 4 CHRONIC KIDNEY DISEASE, OR CHRONIC KIDNEY DISEASE (HCC): Primary | ICD-10-CM

## 2021-11-01 DIAGNOSIS — R94.01 ABNORMAL EEG: ICD-10-CM

## 2021-11-01 DIAGNOSIS — R45.89 SYMPTOMS OF DEPRESSION: ICD-10-CM

## 2021-11-01 PROCEDURE — G9899 SCRN MAM PERF RSLTS DOC: HCPCS | Performed by: NURSE PRACTITIONER

## 2021-11-01 PROCEDURE — 3017F COLORECTAL CA SCREEN DOC REV: CPT | Performed by: NURSE PRACTITIONER

## 2021-11-01 PROCEDURE — 2022F DILAT RTA XM EVC RTNOPTHY: CPT | Performed by: NURSE PRACTITIONER

## 2021-11-01 PROCEDURE — G8536 NO DOC ELDER MAL SCRN: HCPCS | Performed by: NURSE PRACTITIONER

## 2021-11-01 PROCEDURE — 99214 OFFICE O/P EST MOD 30 MIN: CPT | Performed by: NURSE PRACTITIONER

## 2021-11-01 PROCEDURE — G8420 CALC BMI NORM PARAMETERS: HCPCS | Performed by: NURSE PRACTITIONER

## 2021-11-01 PROCEDURE — 1090F PRES/ABSN URINE INCON ASSESS: CPT | Performed by: NURSE PRACTITIONER

## 2021-11-01 PROCEDURE — G8400 PT W/DXA NO RESULTS DOC: HCPCS | Performed by: NURSE PRACTITIONER

## 2021-11-01 PROCEDURE — G8432 DEP SCR NOT DOC, RNG: HCPCS | Performed by: NURSE PRACTITIONER

## 2021-11-01 PROCEDURE — 3046F HEMOGLOBIN A1C LEVEL >9.0%: CPT | Performed by: NURSE PRACTITIONER

## 2021-11-01 PROCEDURE — G8427 DOCREV CUR MEDS BY ELIG CLIN: HCPCS | Performed by: NURSE PRACTITIONER

## 2021-11-01 PROCEDURE — 1101F PT FALLS ASSESS-DOCD LE1/YR: CPT | Performed by: NURSE PRACTITIONER

## 2021-11-01 RX ORDER — HYDRALAZINE HYDROCHLORIDE 25 MG/1
25 TABLET, FILM COATED ORAL 3 TIMES DAILY
Qty: 90 TABLET | Refills: 0 | Status: SHIPPED | OUTPATIENT
Start: 2021-11-01 | End: 2021-11-26

## 2021-11-01 RX ORDER — BLOOD-GLUCOSE METER
KIT MISCELLANEOUS
Qty: 100 STRIP | Refills: 3 | Status: SHIPPED | OUTPATIENT
Start: 2021-11-01

## 2021-11-01 NOTE — PROGRESS NOTES
Chief Complaint   Patient presents with    Diabetes    Medication Refill     Assessment & Plan:     1. Hypertensive heart and chronic kidney disease with heart failure and stage 1 through stage 4 chronic kidney disease, or chronic kidney disease ([facility] [address]  Assessment & Plan:  BP elevated, asymptomatic, goal <130/80  Add Hydralazine 25 mg TID to regimen  Advised to contact cardiology to let them know of the elevated BP  Follow-up in 2 weeks  Orders:  -     hydrALAZINE (APRESOLINE) 25 mg tablet; Take 1 Tablet by mouth three (3) times daily. Indications: high blood pressure, Normal, Disp-90 Tablet, R-0  2. Type 2 diabetes mellitus with hyperglycemia, with long-term current use of insulin [facility])  Assessment & Plan:  Missed appointment with pharmD, will reschedule  Advised to check BG at home, bring log at next appointment  Once again, advised to contact insurance for endocrinology in-network, as this has been a challenge from the start  Overdue for repeat labs, advised to complete 1 week prior to next appointment  Orders:  -     glucose blood VI test strips (FreeStyle Lite Strips) strip; Use to check blood sugars four times daily, Normal, Disp-100 Strip, R-3  3. Memory loss  -     REFERRAL TO NEUROLOGY  4. Abnormal EEG  -     REFERRAL TO NEUROLOGY  5. Symptoms of depression  -     REFERRAL TO PSYCHIATRY    Follow-up and Dispositions    · Return in about 2 weeks (around 11/15/2021) for diabetes, blood pressure, lab results.        Subjective:     HPI    Dementia-  Has completed evaluation by Dr. Sam Cole  Has no appointment with neurology  Per her sister, patient is now making up things  Patient admits that she has some visual hallucinations  Had been evaluated by Dr. Sam Cole and was advised to go to a senior center     Hypertension-  Symptoms: None  BP readings at home are 1-teens to 120s  Comorbid:  HLD, type 2 DM  Followed by cardiology, last seen in April  [facility] CHF Meds             hydrALAZINE (APRESOLINE) 25 mg tablet (Taking) Take 1 Tablet by mouth three (3) times daily. Indications: high blood pressure    furosemide (LASIX) 40 mg tablet (Taking) TAKE 1 TABLET BY MOUTH DAILY. INDICATIONS: ACCUMULATION OF FLUID RESULTING FROM CHRONIC HEART FAILURE    rosuvastatin (CRESTOR) 20 mg tablet (Taking) TAKE 1 TABLET BY MOUTH EVERY DAY AT NIGHT    losartan (COZAAR) 100 mg tablet (Taking) TAKE 1 TAB BY MOUTH DAILY. INDICATIONS: HIGH BLOOD PRESSURE    amLODIPine (NORVASC) 10 mg tablet (Taking) Take 1 Tablet by mouth daily. Indications: high blood pressure    aspirin delayed-release 81 mg tablet (Taking) Take 81 mg by mouth daily. Type 2 DM-  Home BG readings:  Not being taken  Symptoms:  None  On statin:  Yes  Comorbid: HTN, HLD, CHF, CKD  Followed by endocrine: No, followed by pharmD  Treatment:  as below  Was back in the ER on 07/16/2021 for hyperglycemia and possible seizures  Needs BG strips, does not know which kind  Hey eyes have been blurry, cannot see well out of her right eye  Last diabetic eye exam was in April  Key Antihyperglycemic Medications             insulin detemir U-100 (Levemir FlexTouch U-100 Insuln) 100 unit/mL (3 mL) inpn (Taking) 16 Units by SubCUTAneous route Daily (before dinner). Indications: type 2 diabetes mellitus    insulin aspart U-100 (NOVOLOG) 100 unit/mL injection (Taking) 12 Units by SubCUTAneous route Before breakfast, lunch, and dinner. Review of Systems   Constitutional: Negative for chills, fever and malaise/fatigue. Respiratory: Negative for shortness of breath. Cardiovascular: Positive for palpitations. Negative for chest pain. Gastrointestinal: Negative for nausea and vomiting. Neurological: Negative for dizziness and headaches. Psychiatric/Behavioral: Positive for memory loss.      Objective:   BP (!) 193/94 (BP 1 Location: Right arm, BP Patient Position: Sitting, BP Cuff Size: Adult)   Pulse 87   Temp 97.5 °F (36.4 °C) (Oral)   Resp 18   Ht 5' 6\" (1.676 m) Wt 149 lb (67.6 kg)   SpO2 99%   BMI 24.05 kg/m²      Physical Exam  Vitals and nursing note reviewed. Constitutional:       General: She is not in acute distress. Appearance: She is not ill-appearing. HENT:      Head: Normocephalic and atraumatic. Cardiovascular:      Rate and Rhythm: Normal rate and regular rhythm. Heart sounds: No murmur heard. No friction rub. No gallop. Pulmonary:      Effort: Pulmonary effort is normal. No respiratory distress. Breath sounds: No wheezing, rhonchi or rales. Skin:     General: Skin is warm and dry. Neurological:      General: No focal deficit present. Mental Status: She is alert and oriented to person, place, and time. Psychiatric:         Mood and Affect: Mood normal.         Thought Content:  Thought content normal.         Judgment: Judgment normal.        ERNESTINA Gonzalez

## 2021-11-01 NOTE — ASSESSMENT & PLAN NOTE
Missed appointment with pharmD, will reschedule  Advised to check BG at home, bring log at next appointment  Once again, advised to contact insurance for endocrinology in-network, as this has been a challenge from the start  Overdue for repeat labs, advised to complete 1 week prior to next appointment

## 2021-11-01 NOTE — ASSESSMENT & PLAN NOTE
BP elevated, asymptomatic, goal <130/80  Add Hydralazine 25 mg TID to regimen  Advised to contact cardiology to let them know of the elevated BP  Follow-up in 2 weeks

## 2021-11-01 NOTE — PATIENT INSTRUCTIONS
Instructions:    1. Please call me back with the type of glucose strips. Write down your BG readings. 2.  Please call your insurance to find out who is in network for diabetes and rheumatoid arthritis specialists    3. Call cardiology at [phone] to let them know that your blood pressure today was 193/94. Let them know that I have made adjustments but you need an appointment to be re-evaluated. 4.  Please call [facility] to let them know that your vision is getting worse and you would like to be re-evaluated    5. Please complete your fasting labs and urine sample 1 week before you come back to see me.

## 2021-11-01 NOTE — PROGRESS NOTES
Joyce Pop presents today for   Chief Complaint   Patient presents with    Diabetes    Medication Refill       Is someone accompanying this pt? no    Is the patient using any DME equipment during OV? no    Depression Screening:  3 most recent PHQ Screens 11/1/2021   Little interest or pleasure in doing things Not at all   Feeling down, depressed, irritable, or hopeless Not at all   Total Score PHQ 2 0   Trouble falling or staying asleep, or sleeping too much -   Feeling tired or having little energy -   Poor appetite, weight loss, or overeating -   Feeling bad about yourself - or that you are a failure or have let yourself or your family down -   Trouble concentrating on things such as school, work, reading, or watching TV -   Thoughts of being better off dead, or hurting yourself in some way -       Learning Assessment:  Learning Assessment 4/7/2021   PRIMARY LEARNER Patient   HIGHEST LEVEL OF EDUCATION - PRIMARY LEARNER  -   BARRIERS PRIMARY LEARNER -   CO-LEARNER CAREGIVER -   PRIMARY LANGUAGE ENGLISH   LEARNER PREFERENCE PRIMARY DEMONSTRATION   ANSWERED BY patient   RELATIONSHIP SELF       Fall Risk  Fall Risk Assessment, last 12 mths 11/1/2021   Able to walk? Yes   Fall in past 12 months? -   Do you feel unsteady? 1   Are you worried about falling 1   Is TUG test greater than 12 seconds? 1   Is the gait abnormal? 1   Number of falls in past 12 months 0   Fall with injury? -       ADL  No flowsheet data found. Travel Screening:    Travel Screening     Question   Response    In the last month, have you been in contact with someone who was confirmed or suspected to have Coronavirus / COVID-19? No / Unsure    Have you had a COVID-19 viral test in the last 14 days? No    Do you have any of the following new or worsening symptoms? Have you traveled internationally or domestically in the last month?   No      Travel History   Travel since 10/01/21     No documented travel since 10/01/21 Health Maintenance reviewed and discussed and ordered per Provider. Health Maintenance Due   Topic Date Due    MICROALBUMIN Q1  Never done    COVID-19 Vaccine (1) Never done    DTaP/Tdap/Td series (1 - Tdap) Never done    Colorectal Cancer Screening Combo  Never done    Shingrix Vaccine Age 50> (1 of 2) Never done    Low dose CT lung screening  Never done    Breast Cancer Screen Mammogram  Never done    Bone Densitometry (Dexa) Screening  Never done    Pneumococcal 65+ years (1 of 1 - PPSV23) Never done    A1C test (Diabetic or Prediabetic)  07/06/2021    Flu Vaccine (1) Never done   . Coordination of Care:  1. Have you been to the ER, urgent care clinic since your last visit? Hospitalized since your last visit? no    2. Have you seen or consulted any other health care providers outside of the [address] [place] [address] since your last visit? Include any pap smears or colon screening.  No

## 2021-11-10 NOTE — PROGRESS NOTES
Rudy Client presents today for follow-up of hypertension. She was recently seen by her PCP, Ama Hoffman NP on 11/1/21 and was started on hydralazine 25mg TID for elevated and suboptimally controlled blood pressure. She was accompanied to the office today by her sister who is her caregiver. She states that at times, Ms. Adama Palacio does not take some of her medications but for the most part, she is compliant. She does not limit her sodium intake (eats fast food a few times a week, snacks on potato chips) and she drinks a lot of fluids throughout the day. She did not take her medications this morning prior to coming to the office. She is a 70year old female with history of hypertension, diabetes heart failure with preserved ejection fraction, rheumatoid arthritis on methotrexate as well as some adrenal issues. She also has diabetes and memory loss (will be seeing neurology). She states that she has difficulty seeing. She was last seen by Dr. Margie Lopez in April 2021. Her last echo was done at [facility] on 6/4/21 and it showed an EF of 64%, concentric LVH, grade 2 diastolic dysfunction, and PASP of 35 mmHg. Denies chest pain, tightness, heaviness, and palpitations. Denies shortness of breath at rest, dyspnea on exertion, orthopnea and PND. Denies abdominal bloating. Denies lightheadedness, dizziness, and syncope. Denies lower extremity edema and claudication. Denies nausea, vomiting, diarrhea, melena, hematochezia. Denies hematuria, urgency, frequency. Denies fever, chills.         PMH:  Past Medical History:   Diagnosis Date    Arthritis     Autoimmune disease ([facility]     RA    Congestive heart failure (HCC)     Neuropathy     Seizures (HCC)        PSH:  Past Surgical History:   Procedure Laterality Date    HX BREAST BIOPSY Bilateral     HX CATARACT REMOVAL         MEDS:  Current Outpatient Medications   Medication Sig    hydrALAZINE (APRESOLINE) 25 mg tablet Take 1 Tablet by mouth three (3) times daily. Indications: high blood pressure    glucose blood VI test strips (FreeStyle Lite Strips) strip Use to check blood sugars four times daily    hydrocortisone (CORTEF) 10 mg tablet TAKE 1 TABLET BY MOUTH TWICE A DAY    furosemide (LASIX) 40 mg tablet TAKE 1 TABLET BY MOUTH DAILY. INDICATIONS: ACCUMULATION OF FLUID RESULTING FROM CHRONIC HEART FAILURE    rosuvastatin (CRESTOR) 20 mg tablet TAKE 1 TABLET BY MOUTH EVERY DAY AT NIGHT    losartan (COZAAR) 100 mg tablet TAKE 1 TAB BY MOUTH DAILY. INDICATIONS: HIGH BLOOD PRESSURE    amLODIPine (NORVASC) 10 mg tablet Take 1 Tablet by mouth daily. Indications: high blood pressure    insulin detemir U-100 (Levemir FlexTouch U-100 Insuln) 100 unit/mL (3 mL) inpn 16 Units by SubCUTAneous route Daily (before dinner). Indications: type 2 diabetes mellitus    gabapentin (NEURONTIN) 300 mg capsule Take 1 Cap by mouth three (3) times daily. Max Daily Amount: 900 mg.    methotrexate (RHEUMATREX) 2.5 mg tablet Take 15 mg by mouth every Monday.  folic acid (FOLVITE) 1 mg tablet Take 1 mg by mouth daily.  aspirin delayed-release 81 mg tablet Take 81 mg by mouth daily.  Blood-Glucose Meter misc Freestyle Freedom Lite Blood Glucose Meter; E 11.9 Type 2 Diabetes; On Insulin: Yes Z279.4 Long Term (current) Insulin Use.  albuterol (PROVENTIL HFA, VENTOLIN HFA, PROAIR HFA) 90 mcg/actuation inhaler Take 2 Puffs by inhalation every six (6) hours as needed.  insulin aspart U-100 (NOVOLOG) 100 unit/mL injection 12 Units by SubCUTAneous route Before breakfast, lunch, and dinner.  lancets misc Trueplus Lancets; ICD 10 Code: E 11.9 Type 2 Diabetes; On Insulin: Yes Z279.4 Long Term (current) Insulin Use. No current facility-administered medications for this visit. Allergies and Sensitivities:  Allergies   Allergen Reactions    Codeine Other (comments)     Patient does not like the reaction towards her body.        Family History:  Family History Problem Relation Age of Onset    Hypertension Mother     Hypertension Father        Social History:  She  reports that she quit smoking about 12 years ago. Her smoking use included cigarettes. She has a 25.00 pack-year smoking history. She has never used smokeless tobacco.  She  reports no history of alcohol use. Physical:  Visit Vitals  BP (!) 170/72 (BP 1 Location: Left upper arm)   Pulse 80   Ht 5' 6\" (1.676 m)   Wt 67.5 kg (148 lb 12.8 oz)   SpO2 96%   BMI 24.02 kg/m²         Exam:  Neck:  Supple, no JVD, no carotid bruits  CV:  Normal S1 and  S2, no murmurs, rubs, or gallops noted  Lungs:  Clear to ausculation throughout, no wheezes or rales  Abd:  Soft, non-tender, non-distended with good bowel sounds. No hepatosplenomegaly  Extremities:  1+ lower extremity edema      Data:  EKG:  Not done today      LABS:  Lab Results   Component Value Date/Time    Sodium 139 04/06/2021 03:40 PM    Potassium 4.1 04/06/2021 03:40 PM    Chloride 104 04/06/2021 03:40 PM    CO2 28 04/06/2021 03:40 PM    Glucose 151 (H) 04/06/2021 03:40 PM    BUN 17 04/06/2021 03:40 PM    Creatinine 1.0 04/06/2021 03:40 PM     Lab Results   Component Value Date/Time    Cholesterol, total 183 04/06/2021 03:40 PM    HDL Cholesterol 49 04/06/2021 03:40 PM    LDL, calculated 110 (H) 04/06/2021 03:40 PM    Triglyceride 125 04/06/2021 03:40 PM     No results found for: ALT      Impression/Plan:  1. Hypertension, blood pressure quite elevated and suboptimally controlled  2. HFpEF, appears compensated  3. Rheumatoid Arthritis  4. Lower extremity edema, likely due to amlodipine use, high sodium diet, and unlimited fluid intake  5. Diabetes mellitus, recommend Hgb A1c less than 7% from cardiac standpoint  6. Memory loss, was referred to neurology by PCP    Ms. Bishop Garcia was seen today for follow-up of her hypertension. She was started on hydralazine 25mg TID on 11/1/21 by her PCP as her blood pressure was elevated.   She was asked to follow-up with cardiology. She was accompanied by her sister, who is her caregiver. She states that for the most part, Ms. Eamon Haynes is compliant with her medications but does occasionally miss a dose or two of her medications. She also reports that her diet is not low in sodium (fast food, chips for snacks, etc) and she drinks large amounts of liquids throughout the day. Unfortunately, Ms. Eamon Haynes did not take her medications yet this morning and her blood pressure was quite elevated with SBP at 170 mmHg and DBP between 70 and 80 mmHg. I checked her BP several times. She was advised to take her medications at least 2 hours prior to coming to our office for appointments so that we can evaluate the effectiveness of her antihypertensive medications. She will be started on isosorbide mononitrate (Imdur) 60mg every evening (to begin today). Medication teaching done today. All other medications are to remain the same. I discussed staggering her losartan and her amlodipine as she was concerned about taking her medications all at one time in the morning. She will return for follow-up with me in 2 weeks. They were asked to call if she has any problems with the new medication. I also asked that she make sure that her medications are taken at least 2 hours prior to coming to the office. She has 1+ pitting lower extremity edema likely due to CCB (amlodipine) use, high sodium diet, and unlimited fluid intake. She admits to drinking tea, sodas, juice, and lots of water throughout the day. She was advised to limit her sodium intake to about 1500mg per day and to limit her fluid intake to 48 to 56 ounces per day. She takes furosemide but she is defeating the purpose of the medication by drinking large amounts of liquids. She does not exhibit any other signs of decompensated heart failure. Patient education material re:  Low sodium diet and heart healthy diet was attached to her after visit summary.   These topics were discussed with her and her sister and their questions were answered. They were given a blood pressure log and I asked that they monitor her blood pressure at home. She is to take her medications at least 2 hours prior to checking her blood pressure. Greater than 50% of a 35 minute visit was spent in discussion, counseling, and answering questions. She will follow-up with Dr. Margie Lopez as scheduled and as needed. Daniel Cole MSN, FNP-BC    Please note:  Portions of this chart were created with Dragon medical speech to text program.  Unrecognized errors may be present.

## 2021-11-17 ENCOUNTER — OFFICE VISIT (OUTPATIENT)
Dept: CARDIOLOGY CLINIC | Age: 71
End: 2021-11-17
Payer: COMMERCIAL

## 2021-11-17 VITALS
OXYGEN SATURATION: 96 % | WEIGHT: 148.8 LBS | DIASTOLIC BLOOD PRESSURE: 72 MMHG | SYSTOLIC BLOOD PRESSURE: 170 MMHG | HEIGHT: 66 IN | HEART RATE: 80 BPM | BODY MASS INDEX: 23.91 KG/M2

## 2021-11-17 DIAGNOSIS — I50.32 DIASTOLIC CHF, CHRONIC (HCC): ICD-10-CM

## 2021-11-17 DIAGNOSIS — I13.0 HYPERTENSIVE HEART AND CHRONIC KIDNEY DISEASE WITH HEART FAILURE AND STAGE 1 THROUGH STAGE 4 CHRONIC KIDNEY DISEASE, OR CHRONIC KIDNEY DISEASE (HCC): Primary | ICD-10-CM

## 2021-11-17 PROCEDURE — 99214 OFFICE O/P EST MOD 30 MIN: CPT | Performed by: NURSE PRACTITIONER

## 2021-11-17 RX ORDER — ISOSORBIDE MONONITRATE 60 MG/1
60 TABLET, EXTENDED RELEASE ORAL EVERY EVENING
Qty: 30 TABLET | Refills: 4 | Status: SHIPPED | OUTPATIENT
Start: 2021-11-17 | End: 2022-05-17

## 2021-11-17 NOTE — PROGRESS NOTES
Jerrod He presents today for   Chief Complaint   Patient presents with    Follow-up    Hypertension       Is someone accompanying this pt? yes    Is the patient using any DME equipment during OV? Yes, cane    Depression Screening:  3 most recent PHQ Screens 11/1/2021   Little interest or pleasure in doing things Not at all   Feeling down, depressed, irritable, or hopeless Not at all   Total Score PHQ 2 0   Trouble falling or staying asleep, or sleeping too much -   Feeling tired or having little energy -   Poor appetite, weight loss, or overeating -   Feeling bad about yourself - or that you are a failure or have let yourself or your family down -   Trouble concentrating on things such as school, work, reading, or watching TV -   Thoughts of being better off dead, or hurting yourself in some way -       Learning Assessment:  Learning Assessment 4/7/2021   PRIMARY LEARNER Patient   HIGHEST LEVEL OF EDUCATION - PRIMARY LEARNER  -   BARRIERS PRIMARY LEARNER -   [address]  CAREGIVER -   PRIMARY LANGUAGE ENGLISH   LEARNER PREFERENCE PRIMARY DEMONSTRATION   ANSWERED BY patient   RELATIONSHIP SELF       Abuse Screening:  Abuse Screening Questionnaire 6/3/2021   Do you ever feel afraid of your partner? N   Are you in a relationship with someone who physically or mentally threatens you? N   Is it safe for you to go home? Y       Fall Risk  Fall Risk Assessment, last 12 mths 11/1/2021   Able to walk? Yes   Fall in past 12 months? -   Do you feel unsteady? 1   Are you worried about falling 1   Is TUG test greater than 12 seconds? 1   Is the gait abnormal? 1   Number of falls in past 12 months 0   Fall with injury? -       OPIOID RISK TOOL  No flowsheet data found. Coordination of Care:  1. Have you been to the ER, urgent care clinic since your last visit? Yes, passed out, september  Hospitalized since your last visit? yes    2.  Have you seen or consulted any other health care providers outside of the [facility] since your last visit? no Include any pap smears or colon screening.  no

## 2021-11-17 NOTE — PATIENT INSTRUCTIONS
Begin Imdur 60mg once a day (take in the evening)  All other medications to remain the same  Monitor blood pressures at home and keep a diary (make sure that medications are taken at least 2 hours prior to checking blood pressure)  Follow-up with Raheem Merritt in 2 weeks - please take medications at least 2 hours prior to coming to the office  Low sodium diet, less than 2000mg per day (optimally 1500mg)  Decrease fluid intake as you are defeating the purpose of your fluid pill (furosemide)       Low Sodium Diet (2,000 Milligram): Care Instructions  Overview     Limiting sodium can be an important part of managing some health problems. The most common source of sodium is salt. People get most of the salt in their diet from canned, prepared, and packaged foods. Fast food and restaurant meals also are very high in sodium. Your doctor will probably limit your sodium to less than 2,000 milligrams (mg) a day. This limit counts all the sodium in prepared and packaged foods and any salt you add to your food. Follow-up care is a key part of your treatment and safety. Be sure to make and go to all appointments, and call your doctor if you are having problems. It's also a good idea to know your test results and keep a list of the medicines you take. How can you care for yourself at home? Read food labels  · Read labels on cans and food packages. The labels tell you how much sodium is in each serving. Make sure that you look at the serving size. If you eat more than the serving size, you have eaten more sodium. · Food labels also tell you the Percent Daily Value for sodium. Choose products with low Percent Daily Values for sodium. · Be aware that sodium can come in forms other than salt, including monosodium glutamate (MSG), sodium citrate, and sodium bicarbonate (baking soda). MSG is often added to Asian food. When you eat out, you can sometimes ask for food without MSG or added salt.   Buy low-sodium foods  · Buy foods that are labeled \"unsalted\" (no salt added), \"sodium-free\" (less than 5 mg of sodium per serving), or \"low-sodium\" (140 mg or less of sodium per serving). Foods labeled \"reduced-sodium\" and \"light sodium\" may still have too much sodium. Be sure to read the label to see how much sodium you are getting. · Buy fresh vegetables, or frozen vegetables without added sauces. Buy low-sodium versions of canned vegetables, soups, and other canned goods. Prepare low-sodium meals  · Cut back on the amount of salt you use in cooking. This will help you adjust to the taste. Do not add salt after cooking. One teaspoon of salt has about 2,300 mg of sodium. · Take the salt shaker off the table. · Flavor your food with garlic, lemon juice, onion, vinegar, herbs, and spices. Do not use soy sauce, lite soy sauce, steak sauce, onion salt, garlic salt, celery salt, or ketchup on your food. · Use low-sodium salad dressings, sauces, and ketchup. Or make your own salad dressings and sauces without adding salt. · Use less salt (or none) when recipes call for it. You can often use half the salt a recipe calls for without losing flavor. Other foods such as rice, pasta, and grains do not need added salt. · Rinse canned vegetables, and cook them in fresh water. This removes somebut not allof the salt. · Avoid water that is naturally high in sodium or that has been treated with water softeners, which add sodium. If you buy bottled water, read the label and choose a sodium-free brand. Avoid high-sodium foods  · Avoid eating:  ? Smoked, cured, salted, and canned meat, fish, and poultry. ? Ham, diaz, hot dogs, and luncheon meats. ? Regular, hard, and processed cheese and regular peanut butter. ? Crackers with salted tops, and other salted snack foods such as pretzels, chips, and salted popcorn. ? Frozen prepared meals, unless labeled low-sodium. ?  Canned and dried soups, broths, and bouillon, unless labeled sodium-free or low-sodium. ? Canned vegetables, unless labeled sodium-free or low-sodium. ? [place] fries, pizza, tacos, and other fast foods. ? Pickles, olives, ketchup, and other condiments, especially soy sauce, unless labeled sodium-free or low-sodium. Where can you learn more? Go to http://www.gray.com/  Enter V843 in the search box to learn more about \"Low Sodium Diet (2,000 Milligram): Care Instructions. \"  Current as of: December 17, 2020               Content Version: 13.0  © 7183-8525 [employer]. Care instructions adapted under license by [employer] (which disclaims liability or warranty for this information). If you have questions about a medical condition or this instruction, always ask your healthcare professional. [address] any warranty or liability for your use of this information. Heart-Healthy Diet: Care Instructions  Your Care Instructions     A heart-healthy diet has lots of vegetables, fruits, nuts, beans, and whole grains, and is low in salt. It limits foods that are high in saturated fat, such as meats, cheeses, and fried foods. It may be hard to change your diet, but even small changes can lower your risk of heart attack and heart disease. Follow-up care is a key part of your treatment and safety. Be sure to make and go to all appointments, and call your doctor if you are having problems. It's also a good idea to know your test results and keep a list of the medicines you take. How can you care for yourself at home? Watch your portions  · Use food labels to learn what the recommended servings are for the foods you eat. · Eat only the number of calories you need to stay at a healthy weight. If you need to lose weight, eat fewer calories than your body burns (through exercise and other physical activity). Eat more fruits and vegetables  · Eat a variety of fruit and vegetables every day.  Dark green, deep orange, red, or yellow fruits and vegetables are especially good for you. Examples include spinach, carrots, peaches, and berries. · Keep carrots, celery, and other veggies handy for snacks. Buy fruit that is in season and store it where you can see it so that you will be tempted to eat it. · Cook dishes that have a lot of veggies in them, such as stir-fries and soups. Limit saturated fat  · Read food labels, and try to avoid saturated fats. They increase your risk of heart disease. · Use olive or canola oil when you cook. · Bake, broil, grill, or steam foods instead of frying them. · Choose lean meats instead of high-fat meats such as hot dogs and sausages. Cut off all visible fat when you prepare meat. · Eat fish, skinless poultry, and meat alternatives such as soy products instead of high-fat meats. Soy products, such as tofu, may be especially good for your heart. · Choose low-fat or fat-free milk and dairy products. Eat foods high in fiber  · Eat a variety of grain products every day. Include whole-grain foods that have lots of fiber and nutrients. Examples of whole-grain foods include oats, whole wheat bread, and brown rice. · Buy whole-grain breads and cereals, instead of white bread or pastries. Limit salt and sodium  · Limit how much salt and sodium you eat to help lower your blood pressure. · Taste food before you salt it. Add only a little salt when you think you need it. With time, your taste buds will adjust to less salt. · Eat fewer snack items, fast foods, and other high-salt, processed foods. Check food labels for the amount of sodium in packaged foods. · Choose low-sodium versions of canned goods (such as soups, vegetables, and beans). Limit sugar  · Limit drinks and foods with added sugar. These include candy, desserts, and soda pop. Limit alcohol  · Limit alcohol to no more than 2 drinks a day for men and 1 drink a day for women. Too much alcohol can cause health problems.   When should you call for help? Watch closely for changes in your health, and be sure to contact your doctor if:    · You would like help planning heart-healthy meals. Where can you learn more? Go to http://www.kemp.com/  Enter V137 in the search box to learn more about \"Heart-Healthy Diet: Care Instructions. \"  Current as of: December 17, 2020               Content Version: 13.0  © 2006-2021 [employer]. Care instructions adapted under license by [employer] (which disclaims liability or warranty for this information). If you have questions about a medical condition or this instruction, always ask your healthcare professional. [address] any warranty or liability for your use of this information.

## 2021-11-23 NOTE — PROGRESS NOTES
Chief Complaint   Patient presents with    Diabetes    Hypertension     Assessment & Plan:     1. Type 2 diabetes mellitus with hyperglycemia, with long-term current use of insulin ([facility]  Assessment & Plan:  Advised to complete labs and follow-up with pharmD as scheduled  Continue regimen for now  Orders:  -     LIPID PANEL; Future  -     METABOLIC PANEL, COMPREHENSIVE; Future  -     HEMOGLOBIN A1C WITH EAG; Future  -     MICROALBUMIN, UR, RAND W/ MICROALB/CREAT RATIO; Future  -     insulin detemir U-100 (Levemir FlexTouch U-100 Insuln) 100 unit/mL (3 mL) inpn; 18 Units by SubCUTAneous route Daily (before dinner). Indications: type 2 diabetes mellitus, Normal, Disp-5 Adjustable Dose Pre-filled Pen Syringe, R-1  -     insulin aspart U-100 (NOVOLOG) 100 unit/mL injection; 12 Units by SubCUTAneous route Before breakfast, lunch, and dinner. Indications: type 2 diabetes mellitus, Normal, Disp-10 mL, R-2  2. Hypertensive heart and chronic kidney disease with heart failure and stage 1 through stage 4 chronic kidney disease, or chronic kidney disease ([facility]  Assessment & Plan:  BP improved, goal <130/80, follow-up as scheduled with cards  Orders:  -     METABOLIC PANEL, COMPREHENSIVE; Future  -     CBC WITH AUTOMATED DIFF; Future  -     losartan (COZAAR) 100 mg tablet; Take 1 Tablet by mouth daily. Indications: high blood pressure, Normal, Disp-90 Tablet, R-0  3. Diastolic CHF, chronic (HCC)  Assessment & Plan:  Continue management per cards  Orders:  -     METABOLIC PANEL, COMPREHENSIVE; Future  4. Stage 3a chronic kidney disease ([facility]  Assessment & Plan:  Continue ARBs, advised to complete labs  Orders:  -     METABOLIC PANEL, COMPREHENSIVE; Future  5. Needs flu shot  Comments:  Declined  6. Encounter for immunization  Comments:  Declined pneumovax    Follow-up and Dispositions    · Return in about 4 weeks (around 12/22/2021) for diabetes, blood pressure, cholesterol, neuropathy, lab results.        Subjective: HPI    Type 2 DM-  Home BG readings:    Symptoms:  None  On statin:  Yes  Comorbid: HTN, HLD, CHF, CKD  Followed by endocrine: No, followed by pharmD  Treatment:  as below  Was back in the ER on 07/16/2021 for hyperglycemia and possible seizures  Needs BG strips, does not know which kind  Hey eyes have been blurry, cannot see well out of her right eye  Last diabetic eye exam was in April  Key Antihyperglycemic Medications             insulin detemir U-100 (Levemir FlexTouch U-100 Insuln) 100 unit/mL (3 mL) inpn (Taking) 18 Units by SubCUTAneous route Daily (before dinner). Indications: type 2 diabetes mellitus    insulin aspart U-100 (NOVOLOG) 100 unit/mL injection (Taking) 12 Units by SubCUTAneous route Before breakfast, lunch, and dinner. Indications: type 2 diabetes mellitus          Hypertension-  Symptoms: None  BP readings at home 124Z systolic  Comorbid:  HLD, type 2 DM  Followed by cardiology, has an appt on 11/17/2021    Health Maintenance:  COVID-19 vac - will bring card  Pneumonia vac - will give today  Flu vac - will give today  TDAP - previously received at [facility], a couple of mos ago  Shingles vac - recommended  Colorectal cancer screening - FIT done at [facility], negative  Mammogram -advised to complete  DEXA - advised to complete  Urine micro - advised to complete  A1c - advised to complete    Review of Systems   Constitutional: Positive for malaise/fatigue. Negative for chills and fever. Respiratory: Negative for shortness of breath. Cardiovascular: Negative for chest pain. Gastrointestinal: Negative for nausea and vomiting. Musculoskeletal: Negative for myalgias. Neurological: Negative for dizziness and headaches.      Objective:   BP (!) 157/70 (BP 1 Location: Left upper arm, BP Patient Position: Sitting, BP Cuff Size: Adult)   Pulse 84   Temp 97.9 °F (36.6 °C) (Oral)   Resp 18   Ht 5' 6\" (1.676 m)   Wt 149 lb (67.6 kg)   SpO2 100%   BMI 24.05 kg/m²      Physical Exam  Vitals and nursing note reviewed. Constitutional:       General: She is not in acute distress. Appearance: She is not ill-appearing. HENT:      Head: Normocephalic and atraumatic. Cardiovascular:      Rate and Rhythm: Normal rate and regular rhythm. Heart sounds: No murmur heard. No friction rub. No gallop. Pulmonary:      Effort: Pulmonary effort is normal. No respiratory distress. Breath sounds: No wheezing, rhonchi or rales. Skin:     General: Skin is warm and dry. Neurological:      General: No focal deficit present. Mental Status: She is alert and oriented to person, place, and time. Psychiatric:         Mood and Affect: Mood normal.         Thought Content:  Thought content normal.         Judgment: Judgment normal.        ERNESTINA Padilla

## 2021-11-24 ENCOUNTER — OFFICE VISIT (OUTPATIENT)
Dept: FAMILY MEDICINE CLINIC | Age: 71
End: 2021-11-24
Payer: MEDICARE

## 2021-11-24 VITALS
RESPIRATION RATE: 18 BRPM | HEIGHT: 66 IN | HEART RATE: 84 BPM | TEMPERATURE: 97.9 F | OXYGEN SATURATION: 100 % | WEIGHT: 149 LBS | DIASTOLIC BLOOD PRESSURE: 70 MMHG | BODY MASS INDEX: 23.95 KG/M2 | SYSTOLIC BLOOD PRESSURE: 157 MMHG

## 2021-11-24 DIAGNOSIS — Z23 NEEDS FLU SHOT: ICD-10-CM

## 2021-11-24 DIAGNOSIS — E11.65 TYPE 2 DIABETES MELLITUS WITH HYPERGLYCEMIA, WITH LONG-TERM CURRENT USE OF INSULIN (HCC): Primary | ICD-10-CM

## 2021-11-24 DIAGNOSIS — N18.31 STAGE 3A CHRONIC KIDNEY DISEASE (HCC): ICD-10-CM

## 2021-11-24 DIAGNOSIS — I50.32 DIASTOLIC CHF, CHRONIC (HCC): ICD-10-CM

## 2021-11-24 DIAGNOSIS — Z79.4 TYPE 2 DIABETES MELLITUS WITH HYPERGLYCEMIA, WITH LONG-TERM CURRENT USE OF INSULIN (HCC): Primary | ICD-10-CM

## 2021-11-24 DIAGNOSIS — Z23 ENCOUNTER FOR IMMUNIZATION: ICD-10-CM

## 2021-11-24 DIAGNOSIS — I13.0 HYPERTENSIVE HEART AND CHRONIC KIDNEY DISEASE WITH HEART FAILURE AND STAGE 1 THROUGH STAGE 4 CHRONIC KIDNEY DISEASE, OR CHRONIC KIDNEY DISEASE (HCC): ICD-10-CM

## 2021-11-24 PROCEDURE — 99214 OFFICE O/P EST MOD 30 MIN: CPT | Performed by: NURSE PRACTITIONER

## 2021-11-24 RX ORDER — INSULIN ASPART 100 [IU]/ML
12 INJECTION, SOLUTION INTRAVENOUS; SUBCUTANEOUS
Qty: 10 ML | Refills: 2 | Status: SHIPPED | OUTPATIENT
Start: 2021-11-24 | End: 2021-12-02 | Stop reason: SDUPTHER

## 2021-11-24 RX ORDER — INSULIN DETEMIR 100 [IU]/ML
18 INJECTION, SOLUTION SUBCUTANEOUS
Qty: 5 ADJUSTABLE DOSE PRE-FILLED PEN SYRINGE | Refills: 1 | Status: SHIPPED | OUTPATIENT
Start: 2021-11-24 | End: 2022-01-20 | Stop reason: SDUPTHER

## 2021-11-24 RX ORDER — LOSARTAN POTASSIUM 100 MG/1
100 TABLET ORAL DAILY
Qty: 90 TABLET | Refills: 0 | Status: SHIPPED | OUTPATIENT
Start: 2021-11-24

## 2021-11-24 NOTE — PATIENT INSTRUCTIONS

## 2021-11-24 NOTE — PROGRESS NOTES
Jerrod Neri presents today for   Chief Complaint   Patient presents with    Diabetes    Hypertension       Is someone accompanying this pt? No     Is the patient using any DME equipment during OV? No     Depression Screening:  3 most recent PHQ Screens 11/24/2021   Little interest or pleasure in doing things Not at all   Feeling down, depressed, irritable, or hopeless Not at all   Total Score PHQ 2 0   Trouble falling or staying asleep, or sleeping too much -   Feeling tired or having little energy -   Poor appetite, weight loss, or overeating -   Feeling bad about yourself - or that you are a failure or have let yourself or your family down -   Trouble concentrating on things such as school, work, reading, or watching TV -   Thoughts of being better off dead, or hurting yourself in some way -       Learning Assessment:  Learning Assessment 4/7/2021   PRIMARY LEARNER Patient   HIGHEST LEVEL OF EDUCATION - PRIMARY LEARNER  -   BARRIERS PRIMARY LEARNER -   CO-LEARNER CAREGIVER -   PRIMARY LANGUAGE ENGLISH   LEARNER PREFERENCE PRIMARY DEMONSTRATION   ANSWERED BY patient   RELATIONSHIP SELF       Fall Risk  Fall Risk Assessment, last 12 mths 11/1/2021   Able to walk? Yes   Fall in past 12 months? -   Do you feel unsteady? 1   Are you worried about falling 1   Is TUG test greater than 12 seconds? 1   Is the gait abnormal? 1   Number of falls in past 12 months 0   Fall with injury? -       ADL  No flowsheet data found. Travel Screening:    Travel Screening     Question   Response    In the last month, have you been in contact with someone who was confirmed or suspected to have Coronavirus / COVID-19? No / Unsure    Have you had a COVID-19 viral test in the last 14 days? No    Do you have any of the following new or worsening symptoms? Have you traveled internationally or domestically in the last month?   No      Travel History   Travel since 10/24/21      [facility]          Health Maintenance reviewed and discussed and ordered per Provider. Health Maintenance Due   Topic Date Due    MICROALBUMIN Q1  Never done    COVID-19 Vaccine (1) Never done    DTaP/Tdap/Td series (1 - Tdap) Never done    Shingrix Vaccine Age 50> (1 of 2) Never done    Low dose CT lung screening  Never done    Breast Cancer Screen Mammogram  Never done    Bone Densitometry (Dexa) Screening  Never done    Pneumococcal 65+ years (1 of 1 - PPSV23) Never done    A1C test (Diabetic or Prediabetic)  07/06/2021    Flu Vaccine (1) Never done   . Coordination of Care:  1. Have you been to the ER, urgent care clinic since your last visit? Hospitalized since your last visit? No     2. Have you seen or consulted any other health care providers outside of the [address] [place] [address] since your last visit? Include any pap smears or colon screening.  No

## 2021-12-02 DIAGNOSIS — E11.65 TYPE 2 DIABETES MELLITUS WITH HYPERGLYCEMIA, WITH LONG-TERM CURRENT USE OF INSULIN (HCC): ICD-10-CM

## 2021-12-02 DIAGNOSIS — Z79.4 TYPE 2 DIABETES MELLITUS WITH HYPERGLYCEMIA, WITH LONG-TERM CURRENT USE OF INSULIN (HCC): ICD-10-CM

## 2021-12-02 RX ORDER — INSULIN ASPART 100 [IU]/ML
12 INJECTION, SOLUTION INTRAVENOUS; SUBCUTANEOUS
Qty: 10 ML | Refills: 2 | Status: SHIPPED | OUTPATIENT
Start: 2021-12-02 | End: 2022-01-20 | Stop reason: SDUPTHER

## 2021-12-02 NOTE — TELEPHONE ENCOUNTER
Patient requesting refill  Requested Prescriptions     Pending Prescriptions Disp Refills    insulin aspart U-100 (NOVOLOG) 100 unit/mL injection 10 mL 2     Si Units by SubCUTAneous route Before breakfast, lunch, and dinner.  Indications: type 2 diabetes mellitus

## 2021-12-16 ENCOUNTER — OFFICE VISIT (OUTPATIENT)
Dept: CARDIOLOGY CLINIC | Age: 71
End: 2021-12-16
Payer: COMMERCIAL

## 2021-12-16 VITALS
SYSTOLIC BLOOD PRESSURE: 150 MMHG | WEIGHT: 145.8 LBS | BODY MASS INDEX: 23.43 KG/M2 | HEART RATE: 85 BPM | DIASTOLIC BLOOD PRESSURE: 74 MMHG | OXYGEN SATURATION: 98 % | HEIGHT: 66 IN

## 2021-12-16 DIAGNOSIS — I50.32 DIASTOLIC CHF, CHRONIC (HCC): Primary | ICD-10-CM

## 2021-12-16 DIAGNOSIS — I13.0 HYPERTENSIVE HEART AND CHRONIC KIDNEY DISEASE WITH HEART FAILURE AND STAGE 1 THROUGH STAGE 4 CHRONIC KIDNEY DISEASE, OR CHRONIC KIDNEY DISEASE (HCC): ICD-10-CM

## 2021-12-16 PROCEDURE — 99213 OFFICE O/P EST LOW 20 MIN: CPT | Performed by: NURSE PRACTITIONER

## 2021-12-16 NOTE — PATIENT INSTRUCTIONS
Continue present medication regimen  Please make sure that your medications are taken at least 2 hours prior to your appointments at our office  Follow-up with Dr. Farhat Landers as scheduled and as needed  Low sodium diet, 1500mg per day     Low Sodium Diet (2,000 Milligram): Care Instructions  Overview     Limiting sodium can be an important part of managing some health problems. The most common source of sodium is salt. People get most of the salt in their diet from canned, prepared, and packaged foods. Fast food and restaurant meals also are very high in sodium. Your doctor will probably limit your sodium to less than 2,000 milligrams (mg) a day. This limit counts all the sodium in prepared and packaged foods and any salt you add to your food. Follow-up care is a key part of your treatment and safety. Be sure to make and go to all appointments, and call your doctor if you are having problems. It's also a good idea to know your test results and keep a list of the medicines you take. How can you care for yourself at home? Read food labels  · Read labels on cans and food packages. The labels tell you how much sodium is in each serving. Make sure that you look at the serving size. If you eat more than the serving size, you have eaten more sodium. · Food labels also tell you the Percent Daily Value for sodium. Choose products with low Percent Daily Values for sodium. · Be aware that sodium can come in forms other than salt, including monosodium glutamate (MSG), sodium citrate, and sodium bicarbonate (baking soda). MSG is often added to Asian food. When you eat out, you can sometimes ask for food without MSG or added salt. Buy low-sodium foods  · Buy foods that are labeled \"unsalted\" (no salt added), \"sodium-free\" (less than 5 mg of sodium per serving), or \"low-sodium\" (140 mg or less of sodium per serving). Foods labeled \"reduced-sodium\" and \"light sodium\" may still have too much sodium.  Be sure to read the label to see how much sodium you are getting. · Buy fresh vegetables, or frozen vegetables without added sauces. Buy low-sodium versions of canned vegetables, soups, and other canned goods. Prepare low-sodium meals  · Cut back on the amount of salt you use in cooking. This will help you adjust to the taste. Do not add salt after cooking. One teaspoon of salt has about 2,300 mg of sodium. · Take the salt shaker off the table. · Flavor your food with garlic, lemon juice, onion, vinegar, herbs, and spices. Do not use soy sauce, lite soy sauce, steak sauce, onion salt, garlic salt, celery salt, or ketchup on your food. · Use low-sodium salad dressings, sauces, and ketchup. Or make your own salad dressings and sauces without adding salt. · Use less salt (or none) when recipes call for it. You can often use half the salt a recipe calls for without losing flavor. Other foods such as rice, pasta, and grains do not need added salt. · Rinse canned vegetables, and cook them in fresh water. This removes somebut not allof the salt. · Avoid water that is naturally high in sodium or that has been treated with water softeners, which add sodium. If you buy bottled water, read the label and choose a sodium-free brand. Avoid high-sodium foods  · Avoid eating:  ? Smoked, cured, salted, and canned meat, fish, and poultry. ? Ham, diaz, hot dogs, and luncheon meats. ? Regular, hard, and processed cheese and regular peanut butter. ? Crackers with salted tops, and other salted snack foods such as pretzels, chips, and salted popcorn. ? Frozen prepared meals, unless labeled low-sodium. ? Canned and dried soups, broths, and bouillon, unless labeled sodium-free or low-sodium. ? Canned vegetables, unless labeled sodium-free or low-sodium. ? [place] fries, pizza, tacos, and other fast foods. ? Pickles, olives, ketchup, and other condiments, especially soy sauce, unless labeled sodium-free or low-sodium. Where can you learn more?   Go to http://www.gray.com/  Enter U992 in the search box to learn more about \"Low Sodium Diet (2,000 Milligram): Care Instructions. \"  Current as of: December 17, 2020               Content Version: 13.0  © 3423-7936 [employer]. Care instructions adapted under license by [employer] (which disclaims liability or warranty for this information). If you have questions about a medical condition or this instruction, always ask your healthcare professional. [address] any warranty or liability for your use of this information.

## 2021-12-23 DIAGNOSIS — I13.0 HYPERTENSIVE HEART AND CHRONIC KIDNEY DISEASE WITH HEART FAILURE AND STAGE 1 THROUGH STAGE 4 CHRONIC KIDNEY DISEASE, OR CHRONIC KIDNEY DISEASE (HCC): ICD-10-CM

## 2021-12-23 RX ORDER — HYDRALAZINE HYDROCHLORIDE 25 MG/1
25 TABLET, FILM COATED ORAL 3 TIMES DAILY
Qty: 90 TABLET | Refills: 0 | OUTPATIENT
Start: 2021-12-23

## 2021-12-31 DIAGNOSIS — E27.1 ADDISON'S DISEASE (HCC): ICD-10-CM

## 2022-01-03 RX ORDER — HYDROCORTISONE 10 MG/1
TABLET ORAL
Qty: 180 TABLET | Refills: 0 | Status: SHIPPED | OUTPATIENT
Start: 2022-01-03 | End: 2022-01-20 | Stop reason: SDUPTHER

## 2022-01-05 ENCOUNTER — TELEPHONE (OUTPATIENT)
Dept: INTERNAL MEDICINE CLINIC | Age: 72
End: 2022-01-05

## 2022-01-05 NOTE — TELEPHONE ENCOUNTER
Patient has cancelled or no showed last 6 visits that have been scheduled with PharmD for diabetes management. Last visit was in June 2021. Will notify PCP. Thank you,  Cristian Zarco.  NAYELI DawnS

## 2022-01-11 NOTE — TELEPHONE ENCOUNTER
She has an appt with NP Willie Love on 1/19/22. I added comment to appt reason to call and remind her and advise that if she does not keep appt, she will be discharged from PCP/NSFP.

## 2022-01-18 ENCOUNTER — OFFICE VISIT (OUTPATIENT)
Dept: NEUROLOGY | Age: 72
End: 2022-01-18
Payer: MEDICARE

## 2022-01-18 VITALS
OXYGEN SATURATION: 97 % | HEIGHT: 66 IN | BODY MASS INDEX: 23.63 KG/M2 | SYSTOLIC BLOOD PRESSURE: 134 MMHG | HEART RATE: 87 BPM | DIASTOLIC BLOOD PRESSURE: 76 MMHG | WEIGHT: 147 LBS | RESPIRATION RATE: 20 BRPM

## 2022-01-18 DIAGNOSIS — F40.240 CLAUSTROPHOBIA: ICD-10-CM

## 2022-01-18 DIAGNOSIS — R40.4 TRANSIENT ALTERATION OF AWARENESS: ICD-10-CM

## 2022-01-18 DIAGNOSIS — R56.9 SEIZURE (HCC): Primary | ICD-10-CM

## 2022-01-18 PROCEDURE — 99204 OFFICE O/P NEW MOD 45 MIN: CPT | Performed by: STUDENT IN AN ORGANIZED HEALTH CARE EDUCATION/TRAINING PROGRAM

## 2022-01-18 RX ORDER — LORAZEPAM 2 MG/1
2 TABLET ORAL ONCE
Qty: 1 TABLET | Refills: 0 | Status: SHIPPED | OUTPATIENT
Start: 2022-01-18 | End: 2022-01-18

## 2022-01-18 NOTE — PROGRESS NOTES
Mary Rajput 70 y.o. female who was evaluated via audio-only technology on 01/19/2022 for   Chief Complaint   Patient presents with    Diabetes    Hypertension    Seizure    Labs     not completed     Chest Pain     Heart palpitations started a week ago she states that it comes and goes. Assessment & Plan:     1. Non-compliance  Assessment & Plan:  Have attempted for several months to control patient's chronic conditions, however, due to non-compliance with follow up appointments and routine labs, it has been difficult to manage. In addition, patient has been asked several times to contact her insurance for in-network specialists, including endocrine and rheumatology, which has yet to be done. She was also advised to try and activate her Medicare insurance, which would make referrals easier for her to access; however, she was reluctant to do because she was not sure if she was staying in [place] or moving back to [place]. Patient has also missed a total of 6 visits with pharmD, which we had in place for her to assist with her diabetes management, in the interim until she establishes care with endocrine. Patient has been advised that it would be best for her to find a new PCP as a result of her non-compliance. We will provide her with a 90-day supply refill of her medications as appropriate. She was advised to continue to follow up with cardiology for her CHF and HTN. Patient and caretaker, Mrs. Jeremias Barker both agreed with plan. A discharge letter will be mailed to patient's home  2.  Type 2 diabetes mellitus with hyperglycemia, with long-term current use of insulin ([facility])  Assessment & Plan:  Noncompliant with labs and follow up  Missed multiple appointments with pharmD  Emphasized that she needs a specialist to manage her uncontrolled diabetes  Advised again to seek specialist in her network for endocrine  Orders:  -     insulin aspart U-100 (NOVOLOG) 100 unit/mL injection; 12 Units by SubCUTAneous route Before breakfast, lunch, and dinner. Indications: type 2 diabetes mellitus, Normal, Disp-10 mL, R-3  -     insulin detemir U-100 (Levemir FlexTouch U-100 Insuln) 100 unit/mL (3 mL) inpn; 18 Units by SubCUTAneous route Daily (before dinner). Indications: type 2 diabetes mellitus, Normal, Disp-5 Adjustable Dose Pre-filled Pen Syringe, R-0  3. Hypertensive heart and chronic kidney disease with heart failure and stage 1 through stage 4 chronic kidney disease, or chronic kidney disease ([facility] [place] [facility]  Assessment & Plan:  BP goal <130/80  Orders:  -     amLODIPine (NORVASC) 10 mg tablet; Take 1 Tablet by mouth daily. Indications: high blood pressure, Normal, Disp-90 Tablet, R-0  4. Diastolic CHF, chronic (HCC)  Assessment & Plan:  Continue management per cards    5. Rheumatoid arthritis, involving unspecified site, unspecified whether rheumatoid factor present [facility])  Assessment & Plan:  Has been advised multiple times to seek specialist in her insurance network for management  6. Centrilobular emphysema ([facility] [place] [facility]  Assessment & Plan:  Stable on regimen, continue  Orders:  -     albuterol (PROVENTIL HFA, VENTOLIN HFA, PROAIR HFA) 90 mcg/actuation inhaler; Take 2 Puffs by inhalation every six (6) hours as needed for Wheezing or Shortness of Breath., Normal, Disp-18 g, R-3  7. Bilateral femoral artery stenosis (HCC)  Assessment & Plan:  Continue ASA, statin  8. 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 Indications: a condition where the adrenal glands produce less hormones called Seneca's disease, Normal, Disp-180 Tablet, R-0  10. Stage 3a chronic kidney disease ([facility] [place] [facility]  Assessment & Plan:  Continue ARBs, advised to complete fasting labs  11.  Hyperlipidemia associated with type 2 diabetes mellitus (HCC)  -     rosuvastatin (CRESTOR) 20 mg tablet; Take 1 Tablet by mouth nightly. Indications: excessive fat in the blood, Normal, Disp-90 Tablet, R-0  12. Palpitations  Comments:  Advised to contact cardiology, ER if no improvement    Follow-up and Dispositions    · Return for No follow up scheduled, patient dismissed from practice. Subjective:     HPI    Type 2 DM-  Home BG readings range from 60 to 300s  Hypoglycemia:  Yes, occurred twice  On statin:  Yes  Comorbid:  HTN, HLD, CHF, CKD  Followed by endocrine: No  Current treatment:  Levemir 18 units daily, Novolog 12 units TID    Hypertension-  Symptoms:  Palpitations  BP readings at home are 101Y systolic  Comorbid:  HLD, CHF, CKD  Current treatment:  Amlodipine 10 mg daily, isosorbide 60 mg daily, hydralazine 25 mg TID, losartan 100 mg daily  Managed by cardiology    CHF  Compliant with meds and diet  Denies chest pain, SOB, BLE swelling, weight gain  Daily weights are not being taken  Followed by cardiology: Elsi Baer  Treatment:  Furosemide 40 mg daily, losartan 100 mg daily, hydralazine 25 mg TID  Last EF:  46% (02/2020)    CKD-  Stage of Chronic Kidney Disease III   Denies any BLE swelling, SOB, chest pain  NSAID use:  No    Rheumatoid Arthritis -  Had a specialist in [place]  Has not contacted insurance for specialist in the area  Was on methotrexate, ran out    [place]'s Disease-  Current symptoms:  Lethargy  Treatment:  Hydrocortisone 10 mg daily  Referred to endocrine previously    Health Maintenance:  COVID-19 vac - will bring card  TDAP - previously received at [facility], a couple of mos ago  Shingles vac - recommended  Mammogram -advised to complete  DEXA - advised to complete  Urine micro - advised to complete  A1c - advised to complete    Review of Systems   Constitutional: Negative for chills, fever and malaise/fatigue. Respiratory: Negative for shortness of breath. Cardiovascular: Positive for palpitations. Negative for chest pain.    Gastrointestinal: Negative for nausea and vomiting. Neurological: Negative for dizziness and headaches. Objective:      Physical Exam   Constitutional: Alert and oriented, in no distress  HENT:   Ears:  Hearing grossly intact. Pulmonary/Chest: Does not sound dyspneic, no audible wheezes   Neurological:  Intact recent memory, answering questions appropriately. Psychiatric: Judgment and insight good, normal mood. Judy Pratt, who was evaluated through a synchronous (real-time) audio only encounter, and/or her healthcare decision maker, is aware that it is a billable service, which includes applicable co-pays, with coverage as determined by her insurance carrier. She provided verbal consent to proceed and patient identification was verified. This visit was conducted pursuant to the emergency declaration under the [address] [place] [address], [address] [place] [address] authority and the [employer] General Act. A caregiver was present when appropriate. Ability to conduct physical exam was limited. The patient was located at home in a state where the provider was licensed to provide care.      Total time spent:  21-30 minutes  ERNESTINA Joy

## 2022-01-18 NOTE — PROGRESS NOTES
Tony Reeves is a 70 y.o. female . presents for New Patient Nory Morrison, LEAH, Cortez Anand, PhD), Seizure (abnormal EEG), Memory Loss, and Anxiety   . A 70years old female patient with medical history of arthritis, CHF, poorly controlled diabetes and medication noncompliance, and seizure like spells was referred here for evaluation of seizure and also memory problems. She came today with her sister with whom she lives. She initially feels tremulous/shaky followed by falling and generalized shaking. She might fall from her mouth. No incontinence to bowel or bladder. No tongue bite. She had about 4 spells last year. No episodes this year. Last 1 was in June 2021. Most of her episodes were in the course of severe hyperglycemia. During the last admission to the hospital, her blood sugar was in the 800s. Her sister mentions that patient is sleepy most of the day. She states that in the afternoon. She feels tired and has generalized weakness. Her hands and feet feels weak. Also has tingling and numbness over her hands and feet. Has gabapentin which helps some. She stumbles and occasionally falls. Currently, she does not walk or exercise too much. No problems with her speech. No difficulty swallowing. But she might occasionally choke. No history of head trauma. No previous history of stroke. She has forgetfulness. According to her sister, might forget what she just ate and whether she ate or not. No problems with names. Might have difficulty finding items. She is good with numbers. No problem managing her finances. She might forget taking her medications; sister helpless. She might repeat in conversations. She currently do not cook mainly from her vision difficulty. She she is able to take  shower and able to put her clothes on. Might need reminders in self-care.   Had a neuropsychological evaluation in July and the test was suggestive of mild neurocognitive dysfunction, anxiety/depression. Had a CT scan of the brain in October 2021 which did not show any acute changes; has shown global atrophy with microvascular ischemic disease. Review of Systems   Constitutional: Negative for chills, fever and weight loss. HENT: Positive for hearing loss. Negative for tinnitus. Eyes: Positive for blurred vision (moreon the right eye). Negative for double vision. Respiratory: Positive for cough, sputum production and shortness of breath. Negative for hemoptysis. Cardiovascular: Positive for chest pain, palpitations and leg swelling. Gastrointestinal: Positive for nausea. Negative for vomiting. Genitourinary: Positive for frequency. Negative for dysuria and urgency. Musculoskeletal: Positive for back pain. Negative for neck pain. Skin: Positive for itching (occasionally). Negative for rash. Neurological: Positive for tingling, tremors, sensory change and weakness. Negative for dizziness, speech change, focal weakness and headaches. Endo/Heme/Allergies: Bruises/bleeds easily. Psychiatric/Behavioral: Positive for depression and memory loss. Negative for suicidal ideas. The patient does not have insomnia.         Past Medical History:   Diagnosis Date    Arthritis     Autoimmune disease ([facility] [address]     RA    Congestive heart failure (HCC)     Neuropathy     Seizures (HCC)        Past Surgical History:   Procedure Laterality Date    HX BREAST BIOPSY Bilateral     HX CATARACT REMOVAL          Family History   Problem Relation Age of Onset    Hypertension Mother     Hypertension Father         Social History     Socioeconomic History    Marital status: SINGLE     Spouse name: Not on file    Number of children: Not on file    Years of education: Not on file    Highest education level: Not on file   Occupational History    Not on file   Tobacco Use    Smoking status: Former Smoker     Packs/day: 0.50     Years: 50.00     Pack years: 25.00     Types: Cigarettes Quit date: 2009     Years since quittin.0    Smokeless tobacco: Never Used   Vaping Use    Vaping Use: Never used   Substance and Sexual Activity    Alcohol use: Never    Drug use: Never    Sexual activity: Not on file   Other Topics Concern    Not on file   Social History Narrative    Not on file     Social Determinants of Health     Financial Resource Strain:     Difficulty of Paying Living Expenses: Not on file   Food Insecurity:     Worried About Running Out of Food in the Last Year: Not on file    Micah of Food in the Last Year: Not on file   Transportation Needs:     Lack of Transportation (Medical): Not on file    Lack of Transportation (Non-Medical): Not on file   Physical Activity:     Days of Exercise per Week: Not on file    Minutes of Exercise per Session: Not on file   Stress:     Feeling of Stress : Not on file   Social Connections:     Frequency of Communication with Friends and Family: Not on file    Frequency of Social Gatherings with Friends and Family: Not on file    Attends Confucianism Services: Not on file    Active Member of [address] [place] [address] or Organizations: Not on file    Attends Club or Organization Meetings: Not on file    Marital Status: Not on file   Intimate Partner Violence:     Fear of Current or Ex-Partner: Not on file    Emotionally Abused: Not on file    Physically Abused: Not on file    Sexually Abused: Not on file   Housing Stability:     Unable to Pay for Housing in the Last Year: Not on file    Number of [place] in the Last Year: Not on file    Unstable Housing in the Last Year: Not on file        Allergies   Allergen Reactions    Codeine Other (comments)     Patient does not like the reaction towards her body.          Current Outpatient Medications   Medication Sig Dispense Refill    hydrocortisone (CORTEF) 10 mg tablet TAKE 1 TABLET BY MOUTH TWICE A  Tablet 0    insulin aspart U-100 (NOVOLOG) 100 unit/mL injection 12 Units by SubCUTAneous route Before breakfast, lunch, and dinner. Indications: type 2 diabetes mellitus 10 mL 2    hydrALAZINE (APRESOLINE) 25 mg tablet TAKE 1 TABLET BY MOUTH THREE (3) TIMES DAILY. INDICATIONS: HIGH BLOOD PRESSURE 90 Tablet 0    insulin detemir U-100 (Levemir FlexTouch U-100 Insuln) 100 unit/mL (3 mL) inpn 18 Units by SubCUTAneous route Daily (before dinner). Indications: type 2 diabetes mellitus 5 Adjustable Dose Pre-filled Pen Syringe 1    losartan (COZAAR) 100 mg tablet Take 1 Tablet by mouth daily. Indications: high blood pressure 90 Tablet 0    isosorbide mononitrate ER (IMDUR) 60 mg CR tablet Take 1 Tablet by mouth every evening. 30 Tablet 4    glucose blood VI test strips (FreeStyle Lite Strips) strip Use to check blood sugars four times daily 100 Strip 3    furosemide (LASIX) 40 mg tablet TAKE 1 TABLET BY MOUTH DAILY. INDICATIONS: ACCUMULATION OF FLUID RESULTING FROM CHRONIC HEART FAILURE 90 Tablet 0    rosuvastatin (CRESTOR) 20 mg tablet TAKE 1 TABLET BY MOUTH EVERY DAY AT NIGHT 30 Tablet 0    amLODIPine (NORVASC) 10 mg tablet Take 1 Tablet by mouth daily. Indications: high blood pressure 90 Tablet 0    gabapentin (NEURONTIN) 300 mg capsule Take 1 Cap by mouth three (3) times daily. Max Daily Amount: 900 mg. 90 Cap 0    methotrexate (RHEUMATREX) 2.5 mg tablet Take 15 mg by mouth every Monday.  folic acid (FOLVITE) 1 mg tablet Take 1 mg by mouth daily.  aspirin delayed-release 81 mg tablet Take 81 mg by mouth daily.  Blood-Glucose Meter misc Freestyle Freedom Lite Blood Glucose Meter; E 11.9 Type 2 Diabetes; On Insulin: Yes Z279.4 Long Term (current) Insulin Use.  albuterol (PROVENTIL HFA, VENTOLIN HFA, PROAIR HFA) 90 mcg/actuation inhaler Take 2 Puffs by inhalation every six (6) hours as needed.  lancets misc Trueplus Lancets; ICD 10 Code: E 11.9 Type 2 Diabetes; On Insulin: Yes Z279.4 Long Term (current) Insulin Use.            Physical Exam  Constitutional: Appearance: Normal appearance. HENT:      Head: Normocephalic and atraumatic. Mouth/Throat:      Mouth: Mucous membranes are moist.      Pharynx: Oropharynx is clear. No oropharyngeal exudate. Eyes:      Extraocular Movements: Extraocular movements intact. Pupils: Pupils are equal, round, and reactive to light. Pulmonary:      Effort: Pulmonary effort is normal. No respiratory distress. Musculoskeletal:         General: Normal range of motion. Cervical back: Normal range of motion and neck supple. Right lower leg: Edema present. Left lower leg: Edema present. Neurological:      Mental Status: She is alert. Comments: Mental status: Awake, alert, oriented x3, follows simple and complex commands, no neglect, no extinction to DSS or VSS, immediate recall 3/3 and delayed recall 2/3. Speech and languge: fluent, coherent,  and comprehension intact  CN: VFF, EOMI, PERRLA, face sensation intact , no facial asymmetry noted, palate elevation symmetric bilat, SS+SCM 5/5 bilat, tongue midline  Motor: no pronator drift, tone normal throughout, strength 5/5 throughout  Sensory: intact to light touch and PP  throughout  Coordination: FNF  accurate w/o dysmetria  DTR: 2+ throughout. Gait: antalgic. No visits with results within 3 Month(s) from this visit. Latest known visit with results is:   Orders Only on 04/06/2021   Component Date Value Ref Range Status    Glucose 04/06/2021 151* 70 - 99 mg/dL Final    BUN 04/06/2021 17  6 - 22 mg/dL Final    Creatinine 04/06/2021 1.0  0.8 - 1.4 mg/dL Final    Sodium 04/06/2021 139  133 - 145 mmol/L Final    Potassium 04/06/2021 4.1  3.5 - 5.5 mmol/L Final    Chloride 04/06/2021 104  98 - 110 mmol/L Final    CO2 04/06/2021 28  20 - 32 mmol/L Final    Calcium 04/06/2021 9.1  8.4 - 10.5 mg/dL Final    Anion gap 04/06/2021 7.5  3.0 - 15.0 mmol/L Final    Comment: Anion Gap calculation based on electrolyte reference ranges.   Test includes BUN, CO2, Chloride, Creatinine, Glucose, Potassium, Calcium and  Sodium. Estimated GFR results are reported in mL/min/1.73 sq.m. by the MDRD equation. This eGFR is validated for stable chronic renal failure patients. This   equation  is unreliable in acute illness or patients with normal renal function.  GFRAA 04/06/2021 >60.0  >60.0 Final    GFRNA 04/06/2021 52.4* >60.0 Final    Triglyceride 04/06/2021 125  40 - 149 mg/dL Final    HDL Cholesterol 04/06/2021 49  >=40 mg/dL Final    Cholesterol, total 04/06/2021 183  110 - 200 mg/dL Final    CHOLESTEROL/HDL 04/06/2021 3.7  0.0 - 5.0 Final    Non-HDL Cholesterol 04/06/2021 134* <130 mg/dL Final    LDL, calculated 04/06/2021 110* 50 - 99 mg/dL Final    VLDL, calculated 04/06/2021 25  8 - 30 mg/dL Final    LDL/HDL Ratio 04/06/2021 2.3   Final    Comment: Test includes cholesterol, HDL cholesterol, triglycerides and LDL. Cholesterol Recommended NCEP guidelines in mg/dL:  Less than 200            Desirable  200 - 239                Borderline High  Greater than or  = 240   High  Please Note:  Total Chol/HDL Ratio                   Men     Women  1/2 Avg. Risk    3.4     3.3      Avg. Risk    5.0     4.4  2X  Avg. Risk    9.6     7.1  3X  Avg.  Risk   23.4    11.0      Hep C Virus Ab 04/06/2021 None Detected  None Detec Final    WBC 04/06/2021 9.1  4.0 - 11.0 K/uL Final    RBC 04/06/2021 3.97  3.80 - 5.20 M/uL Final    HGB 04/06/2021 10.5* 11.7 - 16.1 g/dL Final    HCT 04/06/2021 33.5* 35.1 - 48.3 % Final    MCV 04/06/2021 84  81 - 99 fL Final    MCH 04/06/2021 26  26 - 34 pg Final    MCHC 04/06/2021 31  31 - 36 g/dL Final    RDW 04/06/2021 15.1  10.0 - 15.5 % Final    PLATELET 46/91/3222 971  140 - 440 K/uL Final    MPV 04/06/2021 12.4  9.0 - 13.0 fL Final    NEUTROPHILS 04/06/2021 59  40 - 75 % Final    Lymphocytes 04/06/2021 26  20 - 45 % Final    MONOCYTES 04/06/2021 10  3 - 12 % Final    EOSINOPHILS 04/06/2021 5  0 - 6 % Final  BASOPHILS 04/06/2021 1  0 - 2 % Final    ABS. NEUTROPHILS 04/06/2021 5.3  1.8 - 7.7 K/uL Final    ABSOLUTE LYMPHOCYTE COUNT 04/06/2021 2.4  1.0 - 4.8 K/uL Final    ABS. MONOCYTES 04/06/2021 0.9  0.1 - 1.0 K/uL Final    ABS. EOSINOPHILS 04/06/2021 0.4  0.0 - 0.5 K/uL Final    ABS. BASOPHILS 04/06/2021 0.1  0.0 - 0.2 K/uL Final    Hemoglobin A1c 04/06/2021 14.1* 4.8 - 5.6 % Final    AVG GLU 04/06/2021 357* 91 - 123 mg/dL Final             ICD-10-CM ICD-9-CM    1. Seizure (HCC)  R56.9 780.39 MRI BRAIN WO CONT   2. Transient alteration of awareness  R40.4 780.02 EEG AWAKE AND ASLEEP      MRI BRAIN WO CONT   3. Claustrophobia  F40.240 300.29 LORazepam (ATIVAN) 2 mg tablet       A 70years old female patient with above medical problems including poorly controlled diabetes and medication noncompliance was referred to for evaluation of seizure-like spells over the past 1 year. She had about 4 episodes of alteration of awareness last year with generalized shaking. Episodes are preceded by tremulousness, weakness, and will be followed by loss of consciousness. No episodes of tongue bite or incontinence. All episodes where in relation to severe hypoglycemia. Last hospital admission was in June 2021 where her blood sugar was in the 800s. CT scan of the brain from October 2021 did not show any acute changes; it has shown volume loss with moderate microvascular ischemic changes. No seizures or seizure-like spells in this patient are most likely related to her hyperglycemia. Patient is advised on good control of her blood sugar. In addition we will get EEG and MRI of her brain. Neuropsychological evaluation was suggestive of mild neurocognitive dysfunction. Patient is not currently driving; advised not to drive. We will see her in the clinic again in 3 months time but will call her with the results of the MRI and EEG.

## 2022-01-18 NOTE — ASSESSMENT & PLAN NOTE
Noncompliant with labs and follow up  Missed multiple appointments with [facility]  Emphasized that she needs a specialist to manage her uncontrolled diabetes  Advised again to seek specialist in her network for endocrine

## 2022-01-19 ENCOUNTER — VIRTUAL VISIT (OUTPATIENT)
Dept: FAMILY MEDICINE CLINIC | Age: 72
End: 2022-01-19
Payer: COMMERCIAL

## 2022-01-19 ENCOUNTER — TELEPHONE (OUTPATIENT)
Dept: FAMILY MEDICINE CLINIC | Age: 72
End: 2022-01-19

## 2022-01-19 DIAGNOSIS — I13.0 HYPERTENSIVE HEART AND CHRONIC KIDNEY DISEASE WITH HEART FAILURE AND STAGE 1 THROUGH STAGE 4 CHRONIC KIDNEY DISEASE, OR CHRONIC KIDNEY DISEASE (HCC): ICD-10-CM

## 2022-01-19 DIAGNOSIS — I70.203 BILATERAL FEMORAL ARTERY STENOSIS (HCC): ICD-10-CM

## 2022-01-19 DIAGNOSIS — E27.1 ADDISON'S DISEASE (HCC): ICD-10-CM

## 2022-01-19 DIAGNOSIS — E11.65 TYPE 2 DIABETES MELLITUS WITH HYPERGLYCEMIA, WITH LONG-TERM CURRENT USE OF INSULIN (HCC): ICD-10-CM

## 2022-01-19 DIAGNOSIS — M06.9 RHEUMATOID ARTHRITIS, INVOLVING UNSPECIFIED SITE, UNSPECIFIED WHETHER RHEUMATOID FACTOR PRESENT (HCC): ICD-10-CM

## 2022-01-19 DIAGNOSIS — E11.69 HYPERLIPIDEMIA ASSOCIATED WITH TYPE 2 DIABETES MELLITUS (HCC): ICD-10-CM

## 2022-01-19 DIAGNOSIS — R00.2 PALPITATIONS: ICD-10-CM

## 2022-01-19 DIAGNOSIS — E78.5 HYPERLIPIDEMIA ASSOCIATED WITH TYPE 2 DIABETES MELLITUS (HCC): ICD-10-CM

## 2022-01-19 DIAGNOSIS — Z91.199 NON-COMPLIANCE: Primary | ICD-10-CM

## 2022-01-19 DIAGNOSIS — Z79.4 TYPE 2 DIABETES MELLITUS WITH HYPERGLYCEMIA, WITH LONG-TERM CURRENT USE OF INSULIN (HCC): ICD-10-CM

## 2022-01-19 DIAGNOSIS — I50.32 DIASTOLIC CHF, CHRONIC (HCC): ICD-10-CM

## 2022-01-19 DIAGNOSIS — N18.31 STAGE 3A CHRONIC KIDNEY DISEASE (HCC): ICD-10-CM

## 2022-01-19 DIAGNOSIS — R56.9 SEIZURE-LIKE ACTIVITY (HCC): ICD-10-CM

## 2022-01-19 DIAGNOSIS — J43.2 CENTRILOBULAR EMPHYSEMA (HCC): ICD-10-CM

## 2022-01-19 PROCEDURE — 99214 OFFICE O/P EST MOD 30 MIN: CPT | Performed by: NURSE PRACTITIONER

## 2022-01-19 NOTE — TELEPHONE ENCOUNTER
Please send letter of discharge to patient. Has had multiple no-shows, including 6 missed visits with pharmD. Has not completed her labs as previously instructed. It has been very difficult to provide adequate care of this patient due to her non-compliance and therefore I have advised her to seek a new PCP in addition to specialists that has been recommended to manage her diabetes, adrenal insufficiency, and RA. She was advised to continue to see her current cardiologist for management of her CHF.

## 2022-01-19 NOTE — PROGRESS NOTES
Jair Lara presents today for   Chief Complaint   Patient presents with    Diabetes    Hypertension    Seizure    Labs     not completed     Chest Pain     Heart palpitations started a week ago she states that it comes and goes. Virtual/telephone visit    Depression Screening:  3 most recent PHQ Screens 1/19/2022   Little interest or pleasure in doing things Not at all   Feeling down, depressed, irritable, or hopeless Not at all   Total Score PHQ 2 0   Trouble falling or staying asleep, or sleeping too much -   Feeling tired or having little energy -   Poor appetite, weight loss, or overeating -   Feeling bad about yourself - or that you are a failure or have let yourself or your family down -   Trouble concentrating on things such as school, work, reading, or watching TV -   Thoughts of being better off dead, or hurting yourself in some way -       Learning Assessment:  Learning Assessment 1/19/2022   PRIMARY LEARNER Patient   HIGHEST LEVEL OF EDUCATION - PRIMARY LEARNER  2 YEARS [address] CAREGIVER No   PRIMARY LANGUAGE ENGLISH   LEARNER PREFERENCE PRIMARY DEMONSTRATION   ANSWERED BY patient    RELATIONSHIP SELF       Fall Risk  Fall Risk Assessment, last 12 mths 1/19/2022   Able to walk? Yes   Fall in past 12 months? 0   Do you feel unsteady? 1   Are you worried about falling 1   Is TUG test greater than 12 seconds? 1   Is the gait abnormal? 1   Number of falls in past 12 months 1   Fall with injury? 0       ADL  No flowsheet data found. Travel Screening:    Travel Screening     No screening recorded since 01/18/22 0000     Travel History   Travel since 12/19/21    No documented travel since 12/19/21         Health Maintenance reviewed and discussed and ordered per Provider.     Health Maintenance Due   Topic Date Due    COVID-19 Vaccine (1) Never done    MICROALBUMIN Q1  Never done    DTaP/Tdap/Td series (1 - Tdap) Never done    Shingrix Vaccine Age 50> (1 of 2) Never done    Low dose CT lung screening  Never done    Breast Cancer Screen Mammogram  Never done    Bone Densitometry (Dexa) Screening  Never done    A1C test (Diabetic or Prediabetic)  07/06/2021   . Coordination of Care:    1. Have you been to the ER, urgent care clinic since your last visit? Hospitalized since your last visit? No     2. Have you seen or consulted any other health care providers outside of the [address] [place] [address] since your last visit? Include any pap smears or colon screening.  No

## 2022-01-20 PROBLEM — Z91.199 NON-COMPLIANCE: Status: ACTIVE | Noted: 2022-01-20

## 2022-01-20 RX ORDER — INSULIN DETEMIR 100 [IU]/ML
18 INJECTION, SOLUTION SUBCUTANEOUS
Qty: 5 ADJUSTABLE DOSE PRE-FILLED PEN SYRINGE | Refills: 0 | Status: SHIPPED | OUTPATIENT
Start: 2022-01-20

## 2022-01-20 RX ORDER — INSULIN ASPART 100 [IU]/ML
12 INJECTION, SOLUTION INTRAVENOUS; SUBCUTANEOUS
Qty: 10 ML | Refills: 3 | Status: SHIPPED | OUTPATIENT
Start: 2022-01-20 | End: 2022-02-02 | Stop reason: CLARIF

## 2022-01-20 RX ORDER — ALBUTEROL SULFATE 90 UG/1
2 AEROSOL, METERED RESPIRATORY (INHALATION)
Qty: 18 G | Refills: 3 | Status: SHIPPED | OUTPATIENT
Start: 2022-01-20

## 2022-01-20 RX ORDER — ROSUVASTATIN CALCIUM 20 MG/1
20 TABLET, COATED ORAL
Qty: 90 TABLET | Refills: 0 | Status: SHIPPED | OUTPATIENT
Start: 2022-01-20

## 2022-01-20 RX ORDER — HYDROCORTISONE 10 MG/1
10 TABLET ORAL 2 TIMES DAILY
Qty: 180 TABLET | Refills: 0 | Status: SHIPPED | OUTPATIENT
Start: 2022-01-20

## 2022-01-20 RX ORDER — AMLODIPINE BESYLATE 10 MG/1
10 TABLET ORAL DAILY
Qty: 90 TABLET | Refills: 0 | Status: SHIPPED | OUTPATIENT
Start: 2022-01-20

## 2022-01-20 NOTE — ASSESSMENT & PLAN NOTE
Have attempted for several months to control patient's chronic conditions, however, due to non-compliance with follow up appointments and routine labs, it has been difficult to manage. In addition, patient has been asked several times to contact her insurance for in-network specialists, including endocrine and rheumatology, which has yet to be done. She was also advised to try and activate her Medicare insurance, which would make referrals easier for her to access; however, she was reluctant to do because she was not sure if she was staying in [place] or moving back to [place]. Patient has also missed a total of 6 visits with pharmD, which we had in place for her to assist with her diabetes management, in the interim until she establishes care with endocrine. Patient has been advised that it would be best for her to find a new PCP as a result of her non-compliance. We will provide her with a 90-day supply refill of her medications as appropriate. She was advised to continue to follow up with cardiology for her CHF and HTN. Patient and caretaker, Mrs. Janay Jacobs both agreed with plan.   A discharge letter will be mailed to patient's home

## 2022-01-25 DIAGNOSIS — R40.4 TRANSIENT ALTERATION OF AWARENESS: ICD-10-CM

## 2022-01-26 DIAGNOSIS — R56.9 SEIZURE (HCC): ICD-10-CM

## 2022-01-26 DIAGNOSIS — R40.4 TRANSIENT ALTERATION OF AWARENESS: ICD-10-CM

## 2022-02-02 ENCOUNTER — TELEPHONE (OUTPATIENT)
Dept: FAMILY MEDICINE CLINIC | Age: 72
End: 2022-02-02

## 2022-02-02 DIAGNOSIS — E11.65 TYPE 2 DIABETES MELLITUS WITH HYPERGLYCEMIA, WITH LONG-TERM CURRENT USE OF INSULIN (HCC): Primary | ICD-10-CM

## 2022-02-02 DIAGNOSIS — Z79.4 TYPE 2 DIABETES MELLITUS WITH HYPERGLYCEMIA, WITH LONG-TERM CURRENT USE OF INSULIN (HCC): Primary | ICD-10-CM

## 2022-02-02 RX ORDER — INSULIN ASPART 100 [IU]/ML
12 INJECTION, SOLUTION INTRAVENOUS; SUBCUTANEOUS
Qty: 4 ADJUSTABLE DOSE PRE-FILLED PEN SYRINGE | Refills: 2 | Status: SHIPPED | OUTPATIENT
Start: 2022-02-02

## 2022-02-02 NOTE — TELEPHONE ENCOUNTER
Patient called and said that her Novolog insulin aspart U-100/ml injection  should be in a pen. She said that the one they picked up from the pharmacy was in a temitope.  Please advise

## 2022-04-27 DIAGNOSIS — E78.5 HYPERLIPIDEMIA ASSOCIATED WITH TYPE 2 DIABETES MELLITUS (HCC): ICD-10-CM

## 2022-04-27 DIAGNOSIS — E11.69 HYPERLIPIDEMIA ASSOCIATED WITH TYPE 2 DIABETES MELLITUS (HCC): ICD-10-CM

## 2022-04-27 DIAGNOSIS — I13.0 HYPERTENSIVE HEART AND CHRONIC KIDNEY DISEASE WITH HEART FAILURE AND STAGE 1 THROUGH STAGE 4 CHRONIC KIDNEY DISEASE, OR CHRONIC KIDNEY DISEASE (HCC): ICD-10-CM

## 2022-04-27 RX ORDER — AMLODIPINE BESYLATE 10 MG/1
TABLET ORAL
Qty: 90 TABLET | Refills: 0 | OUTPATIENT
Start: 2022-04-27

## 2022-04-27 RX ORDER — ROSUVASTATIN CALCIUM 20 MG/1
20 TABLET, COATED ORAL
Qty: 90 TABLET | Refills: 0 | OUTPATIENT
Start: 2022-04-27

## 2022-05-14 DIAGNOSIS — E78.5 HYPERLIPIDEMIA ASSOCIATED WITH TYPE 2 DIABETES MELLITUS (HCC): ICD-10-CM

## 2022-05-14 DIAGNOSIS — E11.69 HYPERLIPIDEMIA ASSOCIATED WITH TYPE 2 DIABETES MELLITUS (HCC): ICD-10-CM

## 2022-05-14 RX ORDER — ROSUVASTATIN CALCIUM 20 MG/1
20 TABLET, COATED ORAL
Qty: 90 TABLET | Refills: 0 | OUTPATIENT
Start: 2022-05-14

## 2022-05-29 DIAGNOSIS — E78.5 HYPERLIPIDEMIA ASSOCIATED WITH TYPE 2 DIABETES MELLITUS (HCC): ICD-10-CM

## 2022-05-29 DIAGNOSIS — E11.69 HYPERLIPIDEMIA ASSOCIATED WITH TYPE 2 DIABETES MELLITUS (HCC): ICD-10-CM

## 2022-05-29 RX ORDER — ROSUVASTATIN CALCIUM 20 MG/1
20 TABLET, COATED ORAL
Qty: 90 TABLET | Refills: 0 | OUTPATIENT
Start: 2022-05-29

## 2022-06-16 DIAGNOSIS — E78.5 HYPERLIPIDEMIA ASSOCIATED WITH TYPE 2 DIABETES MELLITUS (HCC): ICD-10-CM

## 2022-06-16 DIAGNOSIS — E11.69 HYPERLIPIDEMIA ASSOCIATED WITH TYPE 2 DIABETES MELLITUS (HCC): ICD-10-CM

## 2022-06-16 RX ORDER — ROSUVASTATIN CALCIUM 20 MG/1
20 TABLET, COATED ORAL
Qty: 90 TABLET | Refills: 0 | OUTPATIENT
Start: 2022-06-16

## 2022-06-30 DIAGNOSIS — E11.69 HYPERLIPIDEMIA ASSOCIATED WITH TYPE 2 DIABETES MELLITUS (HCC): ICD-10-CM

## 2022-06-30 DIAGNOSIS — E78.5 HYPERLIPIDEMIA ASSOCIATED WITH TYPE 2 DIABETES MELLITUS (HCC): ICD-10-CM

## 2022-06-30 RX ORDER — ROSUVASTATIN CALCIUM 20 MG/1
20 TABLET, COATED ORAL
Qty: 90 TABLET | Refills: 0 | OUTPATIENT
Start: 2022-06-30

## 2022-07-16 DIAGNOSIS — E11.69 HYPERLIPIDEMIA ASSOCIATED WITH TYPE 2 DIABETES MELLITUS (HCC): ICD-10-CM

## 2022-07-16 DIAGNOSIS — E78.5 HYPERLIPIDEMIA ASSOCIATED WITH TYPE 2 DIABETES MELLITUS (HCC): ICD-10-CM

## 2022-07-16 RX ORDER — ROSUVASTATIN CALCIUM 20 MG/1
20 TABLET, COATED ORAL
Qty: 90 TABLET | Refills: 0 | OUTPATIENT
Start: 2022-07-16

## 2023-11-06 NOTE — ASSESSMENT & PLAN NOTE
[facility]  [address] [place] [id number] [phone]      Jennifer Rondon :  1956 MRN:  [id number]    This visit was performed via live interactive two-way Video visit with patient's consent.   Clinician Location:Home  Patient Location: Personal vehicle; parked and in [place].  [address] [place] [address] [address] [place] [address]  Verified patient identity:  [x] Yes    Treatment Plan:   Session  of current treatment plan    2023 Time Visit Began: 8:35 AM  time Visit Ended: 9:21 AM    Session Type:  Therapy 38-52 minutes (61262)  Others Present:  No     Primary Diagnosis: Generalized Anxiety Disorder (F41.1).     Chief complaint in patient's own words:  \"I have been feeling guilty about taking this trip.\"    D:  Patient was seen for follow-up to address anxiety which hinders her ability to engage in good self-care.  She reports that she has been feeling very guilty for the last couple of weeks about spending money on this trip.    Suicide/Homicide/Violence Ideation: Denies.  No imminent safety concerns identified or reported during current encounter.     Change in Medication(s) Reported:  No     Current Outpatient Medications   Medication Sig   • busPIRone (BUSPAR) 30 MG tablet Take 1 tablet by mouth in the morning and 1 tablet in the evening.   • venlafaxine XR (EFFEXOR XR) 150 MG 24 hr capsule Take 1 capsule by mouth daily.   • omeprazole (PrilOSEC) 20 MG capsule Take 1 capsule by mouth daily.   • famotidine (PEPCID) 20 MG tablet Take 1 tablet by mouth in the morning and 1 tablet in the evening.   • celecoxib (CeleBREX) 100 MG capsule TAKE 1 CAPSULE BY MOUTH DAILY   • amoxicillin (AMOXIL) 500 MG capsule Take 4 capsules by mouth daily as needed (1 hour prior to dental work.). One hour prior to procedure   • methotrexate (RHEUMATREX) 2.5 MG tablet TAKE 8 TABLETS BY MOUTH ONCE WEEKLY   • folic acid (FOLATE) 1 MG tablet Take 1 tablet by  Stable, continue regimen per cards mouth daily.   • nystatin (MYCOSTATIN) 862889 UNIT/GM powder Apply topically 3 times daily. (Patient taking differently: Apply topically as needed.)   • hydroxychloroquine (PLAQUENIL) 200 MG tablet Take 1 tablet by mouth in the morning and 1 tablet before bedtime.   • predniSONE (DELTASONE) 10 MG tablet Take 10 to 20 mg daily up to 3 days for flareup of joint symptoms   • Cholecalciferol (VITAMIN D3) 1000 units capsule Take 1,000 Units by mouth.   • diphenhydramine-acetaminophen (TYLENOL PM)  MG Tab Take 2 tablets by mouth nightly as needed.    • Elastic Bandages & Supports (MEDICAL COMPRESSION STOCKINGS) Misc 1 Package daily.   • albuterol 108 (90 Base) MCG/ACT inhaler Inhale 2 puffs into the lungs every 4 hours as needed for Shortness of Breath or Wheezing.     No current facility-administered medications for this visit.       PHQ 9 Scores  PHQ 9 Score Adult PHQ 9 Score Adult PHQ 9 Interpretation   10/18/2023  10:59 AM 2 Minimal Depression   10/9/2023   9:14 AM 0    4/4/2023   3:18 PM 0    10/15/2021  10:17 AM 0    9/23/2021  11:12 AM 8 Mild Depression   9/16/2021  10:09 AM 10 Moderate Depression   9/13/2019   9:22 AM 3 Minimal Depression        JOSE 7 Scores  JOSE 7 Score JOSE 7 Score   10/18/2023  11:30 AM 12   10/9/2023   8:00 AM 5   6/20/2022   1:15 PM 0   10/15/2021  10:30 AM 0   9/23/2021  11:30 AM 0   9/16/2021  10:30 AM 12   9/13/2019   8:30 AM 1        A: Explored and gently challenged the messages that she has internalized about self-care and putting others first.  Discussed that a feeling of guilt does not necessarily mean that one is doing something wrong; in this case, it is just something different.  Patient identified that she is trying to emulate the aunt who raised her and then identified that her aunt did enable her at times.  Encouraged her to remind herself, when she is thinking that she needs to \"be the adult\", that her family members are adults who need to make their own choices and  experience their own consequences.        R:  Patient was pleasant, alert, oriented, receptive to feedback, and engaged in the therapeutic process.  Affect was appropriate. She conveyed understanding regarding education and resources provided.     P:  Plan to continue individual follow-up with next appointment scheduled for 11/20/2023.  Patient was encouraged to contact Behavioral Health with any questions or concerns.  Will continue collaboration with patient's primary care provider, Byron Ansari MD, regarding plan of care.     Treatment Plan:  Treatment plan established this visit.  Treatment plan could not be created during today's session due to the fact that writer was using Haiku (Epic was down).  Treatment plan was sent to the patient for signature after the session was completed.    Discharge Plan:  N/A      Alondra Ureña LCSW

## 2024-02-06 NOTE — ASSESSMENT & PLAN NOTE
Uncontrolled, A1c goal <7  Refer to endocrine, given contact info to make appt  Discussed diet at length, emphasized medication compliance  Recheck A1c in 3 mos 2

## 2025-04-24 NOTE — ASSESSMENT & PLAN NOTE
Continue regimen, patient to contact her insurance to inquire about in-network rheumatologist, will get back to me with name and we will then place referral known